# Patient Record
Sex: FEMALE | Race: WHITE | Employment: FULL TIME | URBAN - METROPOLITAN AREA
[De-identification: names, ages, dates, MRNs, and addresses within clinical notes are randomized per-mention and may not be internally consistent; named-entity substitution may affect disease eponyms.]

---

## 2017-01-31 PROCEDURE — 88305 TISSUE EXAM BY PATHOLOGIST: CPT | Performed by: OBSTETRICS & GYNECOLOGY

## 2017-03-02 ENCOUNTER — OFFICE VISIT (OUTPATIENT)
Dept: INTERNAL MEDICINE CLINIC | Facility: CLINIC | Age: 62
End: 2017-03-02

## 2017-03-02 VITALS
SYSTOLIC BLOOD PRESSURE: 130 MMHG | BODY MASS INDEX: 26.29 KG/M2 | HEIGHT: 64 IN | OXYGEN SATURATION: 98 % | WEIGHT: 154 LBS | HEART RATE: 80 BPM | DIASTOLIC BLOOD PRESSURE: 78 MMHG | TEMPERATURE: 98 F

## 2017-03-02 DIAGNOSIS — Z00.00 ANNUAL PHYSICAL EXAM: ICD-10-CM

## 2017-03-02 DIAGNOSIS — R53.83 FATIGUE, UNSPECIFIED TYPE: ICD-10-CM

## 2017-03-02 DIAGNOSIS — Z12.11 ENCOUNTER FOR SCREENING COLONOSCOPY FOR NON-HIGH-RISK PATIENT: Primary | ICD-10-CM

## 2017-03-02 DIAGNOSIS — Z82.49 FAMILY HISTORY OF HYPERTROPHIC CARDIOMYOPATHY: ICD-10-CM

## 2017-03-02 PROCEDURE — 99386 PREV VISIT NEW AGE 40-64: CPT | Performed by: INTERNAL MEDICINE

## 2017-03-02 RX ORDER — BIOTIN 1 MG
1 TABLET ORAL DAILY
COMMUNITY

## 2017-03-02 NOTE — PROGRESS NOTES
Otis Olivera is a 63year old female. Patient presents with:  Neck Pain: Patient complains of left sided neck pain. She first noticed it last Friday. She tried using a heat patch with no relief. She has tried ibuprofen with some relief.  She works at [employer] a M great aunt- dx age 52's   • Cancer Other    • leukemia[other] [OTHER] Paternal Grandmother 76   • Cancer Paternal Grandmother    • Alcohol and Other Disorders Associated Father    • liver cancer[other] [OTHER] Maternal Grandmother    • aneurysm[other check 2d echo. - CARD ECHO 2D DOPPLER (CPT=93306); Future    3. Annual physical exam  Provided referral to GI, mammogram and pap per gyne. Given orders for fasting blood work. - Comp Metabolic Panel (14) [E]; Future  - Lipid Panel [E]; Future    4.  Nec

## 2017-03-14 RX ORDER — METOCLOPRAMIDE 10 MG/1
10 TABLET ORAL ONCE
Status: CANCELLED | OUTPATIENT
Start: 2017-03-14 | End: 2017-03-14

## 2017-03-14 RX ORDER — ACETAMINOPHEN 325 MG/1
650 TABLET ORAL ONCE
Status: CANCELLED | OUTPATIENT
Start: 2017-03-14 | End: 2017-03-14

## 2017-03-14 RX ORDER — FAMOTIDINE 20 MG/1
20 TABLET ORAL ONCE
Status: CANCELLED | OUTPATIENT
Start: 2017-03-14 | End: 2017-03-14

## 2017-03-16 NOTE — PROGRESS NOTES
Pre-Operative History and Physical    Diagnosis: postmenopausal bleeding    Planned Procedure: Hysteroscopy. D&C    HPI:  Manas Thorpe is a 63year old [id number] Patient's last menstrual period was 12/10/2009.  who presents for evaluation of postmenopausal bl negative; denies shortness of breath  Gastrointestinal: negative; denies heartburn, abdominal pain, diarrhea or constiptation  Genitourinary: negative; denies dysuria, incontinence, vaginal itching or discharge.     Musculoskeletal: negative; denies back pa

## 2017-03-17 ENCOUNTER — ANESTHESIA (OUTPATIENT)
Dept: SURGERY | Facility: HOSPITAL | Age: 62
End: 2017-03-17
Payer: COMMERCIAL

## 2017-03-17 ENCOUNTER — SURGERY (OUTPATIENT)
Age: 62
End: 2017-03-17

## 2017-03-17 ENCOUNTER — ANESTHESIA EVENT (OUTPATIENT)
Dept: SURGERY | Facility: HOSPITAL | Age: 62
End: 2017-03-17
Payer: COMMERCIAL

## 2017-03-17 ENCOUNTER — HOSPITAL ENCOUNTER (OUTPATIENT)
Facility: HOSPITAL | Age: 62
Setting detail: HOSPITAL OUTPATIENT SURGERY
Discharge: HOME | End: 2017-03-17
Attending: OBSTETRICS & GYNECOLOGY | Admitting: OBSTETRICS & GYNECOLOGY
Payer: COMMERCIAL

## 2017-03-17 VITALS
SYSTOLIC BLOOD PRESSURE: 133 MMHG | RESPIRATION RATE: 14 BRPM | BODY MASS INDEX: 26.46 KG/M2 | DIASTOLIC BLOOD PRESSURE: 62 MMHG | WEIGHT: 155 LBS | HEIGHT: 64 IN | TEMPERATURE: 97 F | HEART RATE: 61 BPM | OXYGEN SATURATION: 100 %

## 2017-03-17 DIAGNOSIS — N95.0 POSTMENOPAUSAL BLEEDING: Primary | ICD-10-CM

## 2017-03-17 PROCEDURE — 88305 TISSUE EXAM BY PATHOLOGIST: CPT | Performed by: OBSTETRICS & GYNECOLOGY

## 2017-03-17 PROCEDURE — 0UDB8ZX EXTRACTION OF ENDOMETRIUM, VIA NATURAL OR ARTIFICIAL OPENING ENDOSCOPIC, DIAGNOSTIC: ICD-10-PCS | Performed by: OBSTETRICS & GYNECOLOGY

## 2017-03-17 RX ORDER — ONDANSETRON 2 MG/ML
4 INJECTION INTRAMUSCULAR; INTRAVENOUS ONCE AS NEEDED
Status: DISCONTINUED | OUTPATIENT
Start: 2017-03-17 | End: 2017-03-17

## 2017-03-17 RX ORDER — ONDANSETRON 4 MG/1
4 TABLET, FILM COATED ORAL EVERY 8 HOURS PRN
Status: DISCONTINUED | OUTPATIENT
Start: 2017-03-17 | End: 2017-03-17

## 2017-03-17 RX ORDER — HYDROMORPHONE HYDROCHLORIDE 1 MG/ML
0.2 INJECTION, SOLUTION INTRAMUSCULAR; INTRAVENOUS; SUBCUTANEOUS EVERY 5 MIN PRN
Status: DISCONTINUED | OUTPATIENT
Start: 2017-03-17 | End: 2017-03-17

## 2017-03-17 RX ORDER — ACETAMINOPHEN 325 MG/1
650 TABLET ORAL EVERY 4 HOURS PRN
Status: DISCONTINUED | OUTPATIENT
Start: 2017-03-17 | End: 2017-03-17

## 2017-03-17 RX ORDER — MIDAZOLAM HYDROCHLORIDE 1 MG/ML
INJECTION INTRAMUSCULAR; INTRAVENOUS AS NEEDED
Status: DISCONTINUED | OUTPATIENT
Start: 2017-03-17 | End: 2017-03-17 | Stop reason: SURG

## 2017-03-17 RX ORDER — ONDANSETRON 2 MG/ML
4 INJECTION INTRAMUSCULAR; INTRAVENOUS EVERY 8 HOURS PRN
Status: DISCONTINUED | OUTPATIENT
Start: 2017-03-17 | End: 2017-03-17

## 2017-03-17 RX ORDER — MORPHINE SULFATE 2 MG/ML
2 INJECTION, SOLUTION INTRAMUSCULAR; INTRAVENOUS EVERY 10 MIN PRN
Status: DISCONTINUED | OUTPATIENT
Start: 2017-03-17 | End: 2017-03-17

## 2017-03-17 RX ORDER — HYDROCODONE BITARTRATE AND ACETAMINOPHEN 5; 325 MG/1; MG/1
2 TABLET ORAL AS NEEDED
Status: DISCONTINUED | OUTPATIENT
Start: 2017-03-17 | End: 2017-03-17

## 2017-03-17 RX ORDER — HYDROMORPHONE HYDROCHLORIDE 1 MG/ML
0.4 INJECTION, SOLUTION INTRAMUSCULAR; INTRAVENOUS; SUBCUTANEOUS EVERY 5 MIN PRN
Status: DISCONTINUED | OUTPATIENT
Start: 2017-03-17 | End: 2017-03-17

## 2017-03-17 RX ORDER — MORPHINE SULFATE 10 MG/ML
6 INJECTION, SOLUTION INTRAMUSCULAR; INTRAVENOUS EVERY 10 MIN PRN
Status: DISCONTINUED | OUTPATIENT
Start: 2017-03-17 | End: 2017-03-17

## 2017-03-17 RX ORDER — HYDROCODONE BITARTRATE AND ACETAMINOPHEN 5; 325 MG/1; MG/1
1 TABLET ORAL EVERY 6 HOURS PRN
Qty: 20 TABLET | Refills: 0 | Status: SHIPPED | COMMUNITY
Start: 2017-03-17 | End: 2017-10-17

## 2017-03-17 RX ORDER — HYDROCODONE BITARTRATE AND ACETAMINOPHEN 5; 325 MG/1; MG/1
1 TABLET ORAL EVERY 4 HOURS PRN
Status: DISCONTINUED | OUTPATIENT
Start: 2017-03-17 | End: 2017-03-17

## 2017-03-17 RX ORDER — SODIUM CHLORIDE, SODIUM LACTATE, POTASSIUM CHLORIDE, CALCIUM CHLORIDE 600; 310; 30; 20 MG/100ML; MG/100ML; MG/100ML; MG/100ML
INJECTION, SOLUTION INTRAVENOUS CONTINUOUS
Status: DISCONTINUED | OUTPATIENT
Start: 2017-03-17 | End: 2017-03-17

## 2017-03-17 RX ORDER — LIDOCAINE HYDROCHLORIDE 10 MG/ML
INJECTION, SOLUTION EPIDURAL; INFILTRATION; INTRACAUDAL; PERINEURAL AS NEEDED
Status: DISCONTINUED | OUTPATIENT
Start: 2017-03-17 | End: 2017-03-17 | Stop reason: SURG

## 2017-03-17 RX ORDER — HYDROCODONE BITARTRATE AND ACETAMINOPHEN 5; 325 MG/1; MG/1
1 TABLET ORAL AS NEEDED
Status: DISCONTINUED | OUTPATIENT
Start: 2017-03-17 | End: 2017-03-17

## 2017-03-17 RX ORDER — ONDANSETRON 2 MG/ML
INJECTION INTRAMUSCULAR; INTRAVENOUS AS NEEDED
Status: DISCONTINUED | OUTPATIENT
Start: 2017-03-17 | End: 2017-03-17 | Stop reason: SURG

## 2017-03-17 RX ORDER — MORPHINE SULFATE 4 MG/ML
4 INJECTION, SOLUTION INTRAMUSCULAR; INTRAVENOUS EVERY 10 MIN PRN
Status: DISCONTINUED | OUTPATIENT
Start: 2017-03-17 | End: 2017-03-17

## 2017-03-17 RX ORDER — DEXAMETHASONE SODIUM PHOSPHATE 4 MG/ML
VIAL (ML) INJECTION AS NEEDED
Status: DISCONTINUED | OUTPATIENT
Start: 2017-03-17 | End: 2017-03-17 | Stop reason: SURG

## 2017-03-17 RX ORDER — HYDROMORPHONE HYDROCHLORIDE 1 MG/ML
0.6 INJECTION, SOLUTION INTRAMUSCULAR; INTRAVENOUS; SUBCUTANEOUS EVERY 5 MIN PRN
Status: DISCONTINUED | OUTPATIENT
Start: 2017-03-17 | End: 2017-03-17

## 2017-03-17 RX ORDER — NALOXONE HYDROCHLORIDE 0.4 MG/ML
80 INJECTION, SOLUTION INTRAMUSCULAR; INTRAVENOUS; SUBCUTANEOUS AS NEEDED
Status: DISCONTINUED | OUTPATIENT
Start: 2017-03-17 | End: 2017-03-17

## 2017-03-17 RX ORDER — HYDROCODONE BITARTRATE AND ACETAMINOPHEN 5; 325 MG/1; MG/1
2 TABLET ORAL EVERY 4 HOURS PRN
Status: DISCONTINUED | OUTPATIENT
Start: 2017-03-17 | End: 2017-03-17

## 2017-03-17 RX ADMIN — SODIUM CHLORIDE, SODIUM LACTATE, POTASSIUM CHLORIDE, CALCIUM CHLORIDE: 600; 310; 30; 20 INJECTION, SOLUTION INTRAVENOUS at 07:28:00

## 2017-03-17 RX ADMIN — MIDAZOLAM HYDROCHLORIDE 2 MG: 1 INJECTION INTRAMUSCULAR; INTRAVENOUS at 07:28:00

## 2017-03-17 RX ADMIN — LIDOCAINE HYDROCHLORIDE 30 MG: 10 INJECTION, SOLUTION EPIDURAL; INFILTRATION; INTRACAUDAL; PERINEURAL at 07:33:00

## 2017-03-17 RX ADMIN — DEXAMETHASONE SODIUM PHOSPHATE 4 MG: 4 MG/ML VIAL (ML) INJECTION at 07:33:00

## 2017-03-17 RX ADMIN — SODIUM CHLORIDE, SODIUM LACTATE, POTASSIUM CHLORIDE, CALCIUM CHLORIDE: 600; 310; 30; 20 INJECTION, SOLUTION INTRAVENOUS at 08:00:00

## 2017-03-17 RX ADMIN — ONDANSETRON 4 MG: 2 INJECTION INTRAMUSCULAR; INTRAVENOUS at 08:01:00

## 2017-03-17 NOTE — ANESTHESIA PREPROCEDURE EVALUATION
Anesthesia PreOp Note    HPI:     Sindy Livingston is a 63year old female who presents for preoperative consultation requested by: Denia Michelle MD    Date of Surgery: 3/17/2017    Procedure(s):   HYSTEROSCOPY DILATION AND CURETTAGE  Indication: post menopa Paternal Grandmother    • Alcohol and Other Disorders Associated Father    • liver cancer[other] [OTHER] Maternal Grandmother    • aneurysm[other] [OTHER] Maternal Grandmother      aneurysm of stomach   • Cancer Maternal Grandmother    • Cancer Maternal Gr Anesthesia Plan:   ASA:  2  Plan:   General  Airway:  LMA  Post-op Pain Management: IV analgesics  Informed Consent Plan and Risks Discussed With:  Patient  Discussed plan with:  CRNA      I have informed Ashley Acevedo  of the nature of the anesthetic tawanna

## 2017-03-17 NOTE — OPERATIVE REPORT
[facility]    PATIENT'S NAME: Loreta Whitehall   ATTENDING PHYSICIAN: Krystyna Davis MD   OPERATING PHYSICIAN: Krystyna Davis MD   PATIENT ACCOUNT#:   [id number]    LOCATION:  [facility] [address] [place] [address]  MEDICAL RECORD #:   [id number]       DATE OF BIRTH:

## 2017-03-17 NOTE — DISCHARGE SUMMARY
[facility]    Discharge Summary    Amanda Murphy Patient Status:  Hospital Outpatient Surgery    1/15/1955 MRN [id number]   Location [place] [address] Attending Fauzia De Jesus MD   Hosp Day # 0 PCP DO Antonio Gonzalez · Do not sign legal documents within 24 hours of your procedure   · If you had a nerve block for your surgery, take extra care not to put any pressure on your arm or hand for 24 hours    It is normal:  · For you to have a sore throat if you had a breathi answer the questions and return in the provided self-addressed stamped envelope. The questionnaire should take no longer than a few minutes to complete and your answers are private. Your health and feedback are important to us!     If you receive a NSQI

## 2017-03-17 NOTE — H&P
Pre-Operative History and Physical    Diagnosis: postmenopausal bleeding    Planned Procedure: Hysteroscopy. D&C    HPI:  Manas Thorpe is a 63year old [id number] Patient's last menstrual period was 12/10/2009.  who presents for evaluation of postmenopausal bl congestion or sinus pain  Cardiovascular: negative; denies chest pain or palpitations  Respiratory: negative; denies shortness of breath  Gastrointestinal: negative; denies heartburn, abdominal pain, diarrhea or constiptation  Genitourinary: negative; philipp

## 2017-03-17 NOTE — BRIEF OP NOTE
[facility] [address]  Brief Op Note     Mitch Mooney Location: OR   [facility] [id number] MRN [id number]   Admission Date 3/17/2017 Operation Date 3/17/2017   Attending Physician Ramon Garvey MD Operating Physician Ed Dawn MD       Pre-Oper

## 2017-03-21 NOTE — PROGRESS NOTES
Quick Note:    [phone] (home)   Telephone Information:  Mobile [phone]  [facility]    ______

## 2017-10-17 ENCOUNTER — OFFICE VISIT (OUTPATIENT)
Dept: INTERNAL MEDICINE CLINIC | Facility: CLINIC | Age: 62
End: 2017-10-17

## 2017-10-17 VITALS
BODY MASS INDEX: 27.38 KG/M2 | HEART RATE: 80 BPM | DIASTOLIC BLOOD PRESSURE: 76 MMHG | WEIGHT: 160.38 LBS | SYSTOLIC BLOOD PRESSURE: 136 MMHG | TEMPERATURE: 98 F | OXYGEN SATURATION: 98 % | HEIGHT: 64 IN

## 2017-10-17 DIAGNOSIS — N30.01 ACUTE CYSTITIS WITH HEMATURIA: Primary | ICD-10-CM

## 2017-10-17 PROCEDURE — 99212 OFFICE O/P EST SF 10 MIN: CPT | Performed by: INTERNAL MEDICINE

## 2017-10-17 PROCEDURE — 99213 OFFICE O/P EST LOW 20 MIN: CPT | Performed by: INTERNAL MEDICINE

## 2017-10-17 RX ORDER — MELATONIN
1000 DAILY
COMMUNITY

## 2017-10-17 RX ORDER — NITROFURANTOIN 25; 75 MG/1; MG/1
100 CAPSULE ORAL 2 TIMES DAILY
Qty: 14 CAPSULE | Refills: 0 | Status: SHIPPED | OUTPATIENT
Start: 2017-10-17 | End: 2017-12-06 | Stop reason: ALTCHOICE

## 2017-10-17 NOTE — PROGRESS NOTES
Otis Olivera is a 63year old female. Patient presents with:  Urinary: Patient noticed blood in her urine a couple weeks ago. It seemed to subside. The last couple of days she has noticied the blood again. Denies burning.  Feels a \"pinch\" after she urinate 12/10/2009   SpO2 98%   BMI 27.53 kg/m²   GENERAL: well developed, well nourished, in no apparent distress  BACK: no CVA tenderness  CARDIO: RRR, normal S1S2, without murmur or gallop  GI: soft, NT, ND, NABS, no HSM      ASSESSMENT AND PLAN:     1.  Acute c

## 2017-10-31 ENCOUNTER — TELEPHONE (OUTPATIENT)
Dept: INTERNAL MEDICINE CLINIC | Facility: CLINIC | Age: 62
End: 2017-10-31

## 2017-10-31 DIAGNOSIS — R31.9 HEMATURIA, UNSPECIFIED TYPE: Primary | ICD-10-CM

## 2017-10-31 NOTE — TELEPHONE ENCOUNTER
Pt was seen on 10/17 for UTI, symptoms cleared but last night the symptom returned she has blood in urine and a little blod clot. It is the same as before. Please call pt to discuss [phone].    Tasked to nursing high

## 2017-10-31 NOTE — TELEPHONE ENCOUNTER
To Dr. Leslie Jeong - see below - sx did clear up, last abx dose was last Tuesday AM - last evening noticed hematuria, clear red now is dark red. Pt will increase fluids today. Vaginal spotting, D&C (3/17) was negative - seeing OB in December.

## 2017-10-31 NOTE — TELEPHONE ENCOUNTER
To nursing,  I note pt called with sx of blood in urine-similar symptoms she had when she saw Dr. Elba Bean 2 weeks ago. tell patient on 10/17/17 urine culture was negative for urinary tract infection.  Therefore it does not appear her blood in urine is r

## 2017-10-31 NOTE — TELEPHONE ENCOUNTER
To Dr. Wills Cons - your message relayed to pt. understanding verbalized.  Pt unable to come today/tomorrow (taking mother to  Today, works tomorrow) but will call Thursday AM to make appt with Dr. Terence Rosales for \"same day/sick\" appt since that is all Dr. Baldemar Barber

## 2017-11-02 ENCOUNTER — APPOINTMENT (OUTPATIENT)
Dept: LAB | Age: 62
End: 2017-11-02
Attending: INTERNAL MEDICINE
Payer: COMMERCIAL

## 2017-11-02 DIAGNOSIS — R31.9 HEMATURIA, UNSPECIFIED TYPE: ICD-10-CM

## 2017-11-02 PROCEDURE — 81001 URINALYSIS AUTO W/SCOPE: CPT

## 2017-11-02 PROCEDURE — 81015 MICROSCOPIC EXAM OF URINE: CPT

## 2017-11-02 PROCEDURE — 87086 URINE CULTURE/COLONY COUNT: CPT

## 2017-11-02 NOTE — TELEPHONE ENCOUNTER
To Dr. Harriet Sánchez - see below - Pt stopped bleeding as of yesterday evening - not bleeding at all - random onset. Pt has appt at [facility] today with you - does she need to be seen?   See specialist?

## 2017-11-02 NOTE — TELEPHONE ENCOUNTER
Per Dr. Kylie Burton, U/A, urine culture and urine microscopy ordered. Pt informed to keep hydrated and drop off urine sample at lab. Pt is to f/u with Dr. Xander Maloney next week. Pt informed and verbalized understanding.

## 2017-11-05 ENCOUNTER — TELEPHONE (OUTPATIENT)
Dept: INTERNAL MEDICINE CLINIC | Facility: CLINIC | Age: 62
End: 2017-11-05

## 2017-11-05 NOTE — TELEPHONE ENCOUNTER
Pt had repeat UA on 11/2 -- large blood on dipstick but only 2 RBC's on the micro. Given her reports of gross hematuria at home, I recommend that she see urology Dr. oYvany Falcon.   Also I see that pt has an appt with me on 11/7/17 -- not sure why; she

## 2017-11-06 NOTE — TELEPHONE ENCOUNTER
Patient called back - relayed DR. VENTURA message and gave number for DR. Wanetta Dubin [phone] . Patient verbalized understanding and cancelled milo for tomorrow 11/7 with DR. Diego Sharp

## 2017-12-09 ENCOUNTER — HOSPITAL ENCOUNTER (OUTPATIENT)
Dept: CT IMAGING | Facility: HOSPITAL | Age: 62
Discharge: HOME | End: 2017-12-09
Attending: UROLOGY
Payer: COMMERCIAL

## 2017-12-09 DIAGNOSIS — K31.0 ACUTE DILATATION OF STOMACH: ICD-10-CM

## 2017-12-09 PROCEDURE — 74178 CT ABD&PLV WO CNTR FLWD CNTR: CPT | Performed by: UROLOGY

## 2017-12-13 ENCOUNTER — LAB ENCOUNTER (OUTPATIENT)
Dept: LAB | Facility: HOSPITAL | Age: 62
End: 2017-12-13
Attending: UROLOGY
Payer: COMMERCIAL

## 2017-12-13 ENCOUNTER — APPOINTMENT (OUTPATIENT)
Dept: LAB | Facility: HOSPITAL | Age: 62
End: 2017-12-13
Attending: UROLOGY
Payer: COMMERCIAL

## 2017-12-13 DIAGNOSIS — D49.4 BLADDER NEOPLASM: Primary | ICD-10-CM

## 2017-12-13 DIAGNOSIS — D49.4 BLADDER NEOPLASM: ICD-10-CM

## 2017-12-13 PROCEDURE — 93005 ELECTROCARDIOGRAM TRACING: CPT

## 2017-12-13 PROCEDURE — 36415 COLL VENOUS BLD VENIPUNCTURE: CPT

## 2017-12-13 PROCEDURE — 80048 BASIC METABOLIC PNL TOTAL CA: CPT

## 2017-12-13 PROCEDURE — 93010 ELECTROCARDIOGRAM REPORT: CPT | Performed by: UROLOGY

## 2017-12-13 PROCEDURE — 85025 COMPLETE CBC W/AUTO DIFF WBC: CPT

## 2017-12-13 PROCEDURE — 85610 PROTHROMBIN TIME: CPT

## 2017-12-18 ENCOUNTER — SURGERY (OUTPATIENT)
Age: 62
End: 2017-12-18

## 2017-12-18 ENCOUNTER — ANESTHESIA (OUTPATIENT)
Dept: SURGERY | Facility: HOSPITAL | Age: 62
End: 2017-12-18
Payer: COMMERCIAL

## 2017-12-18 ENCOUNTER — ANESTHESIA EVENT (OUTPATIENT)
Dept: SURGERY | Facility: HOSPITAL | Age: 62
End: 2017-12-18
Payer: COMMERCIAL

## 2017-12-18 ENCOUNTER — HOSPITAL ENCOUNTER (OUTPATIENT)
Facility: HOSPITAL | Age: 62
Setting detail: HOSPITAL OUTPATIENT SURGERY
Discharge: HOME | End: 2017-12-18
Attending: UROLOGY | Admitting: UROLOGY
Payer: COMMERCIAL

## 2017-12-18 VITALS
DIASTOLIC BLOOD PRESSURE: 76 MMHG | HEIGHT: 64 IN | OXYGEN SATURATION: 98 % | HEART RATE: 63 BPM | WEIGHT: 156 LBS | BODY MASS INDEX: 26.63 KG/M2 | SYSTOLIC BLOOD PRESSURE: 146 MMHG | RESPIRATION RATE: 14 BRPM | TEMPERATURE: 97 F

## 2017-12-18 PROCEDURE — 88307 TISSUE EXAM BY PATHOLOGIST: CPT | Performed by: UROLOGY

## 2017-12-18 PROCEDURE — 3E0K805 INTRODUCTION OF OTHER ANTINEOPLASTIC INTO GENITOURINARY TRACT, VIA NATURAL OR ARTIFICIAL OPENING ENDOSCOPIC: ICD-10-PCS | Performed by: UROLOGY

## 2017-12-18 PROCEDURE — 0TBB8ZX EXCISION OF BLADDER, VIA NATURAL OR ARTIFICIAL OPENING ENDOSCOPIC, DIAGNOSTIC: ICD-10-PCS | Performed by: UROLOGY

## 2017-12-18 RX ORDER — HYDROMORPHONE HYDROCHLORIDE 1 MG/ML
0.4 INJECTION, SOLUTION INTRAMUSCULAR; INTRAVENOUS; SUBCUTANEOUS EVERY 5 MIN PRN
Status: DISCONTINUED | OUTPATIENT
Start: 2017-12-18 | End: 2017-12-18

## 2017-12-18 RX ORDER — CLINDAMYCIN PHOSPHATE 150 MG/ML
INJECTION, SOLUTION INTRAVENOUS AS NEEDED
Status: DISCONTINUED | OUTPATIENT
Start: 2017-12-18 | End: 2017-12-18 | Stop reason: SURG

## 2017-12-18 RX ORDER — SODIUM CHLORIDE, SODIUM LACTATE, POTASSIUM CHLORIDE, CALCIUM CHLORIDE 600; 310; 30; 20 MG/100ML; MG/100ML; MG/100ML; MG/100ML
INJECTION, SOLUTION INTRAVENOUS CONTINUOUS
Status: DISCONTINUED | OUTPATIENT
Start: 2017-12-18 | End: 2017-12-18

## 2017-12-18 RX ORDER — MORPHINE SULFATE 4 MG/ML
4 INJECTION, SOLUTION INTRAMUSCULAR; INTRAVENOUS EVERY 10 MIN PRN
Status: DISCONTINUED | OUTPATIENT
Start: 2017-12-18 | End: 2017-12-18

## 2017-12-18 RX ORDER — HYDROCODONE BITARTRATE AND ACETAMINOPHEN 5; 325 MG/1; MG/1
1 TABLET ORAL AS NEEDED
Status: DISCONTINUED | OUTPATIENT
Start: 2017-12-18 | End: 2017-12-18

## 2017-12-18 RX ORDER — ONDANSETRON 2 MG/ML
INJECTION INTRAMUSCULAR; INTRAVENOUS AS NEEDED
Status: DISCONTINUED | OUTPATIENT
Start: 2017-12-18 | End: 2017-12-18 | Stop reason: SURG

## 2017-12-18 RX ORDER — HYDROCODONE BITARTRATE AND ACETAMINOPHEN 5; 325 MG/1; MG/1
2 TABLET ORAL AS NEEDED
Status: DISCONTINUED | OUTPATIENT
Start: 2017-12-18 | End: 2017-12-18

## 2017-12-18 RX ORDER — MORPHINE SULFATE 2 MG/ML
2 INJECTION, SOLUTION INTRAMUSCULAR; INTRAVENOUS EVERY 10 MIN PRN
Status: DISCONTINUED | OUTPATIENT
Start: 2017-12-18 | End: 2017-12-18

## 2017-12-18 RX ORDER — HYDROMORPHONE HYDROCHLORIDE 1 MG/ML
0.2 INJECTION, SOLUTION INTRAMUSCULAR; INTRAVENOUS; SUBCUTANEOUS EVERY 5 MIN PRN
Status: DISCONTINUED | OUTPATIENT
Start: 2017-12-18 | End: 2017-12-18

## 2017-12-18 RX ORDER — NALOXONE HYDROCHLORIDE 0.4 MG/ML
80 INJECTION, SOLUTION INTRAMUSCULAR; INTRAVENOUS; SUBCUTANEOUS AS NEEDED
Status: DISCONTINUED | OUTPATIENT
Start: 2017-12-18 | End: 2017-12-18

## 2017-12-18 RX ORDER — MORPHINE SULFATE 10 MG/ML
6 INJECTION, SOLUTION INTRAMUSCULAR; INTRAVENOUS EVERY 10 MIN PRN
Status: DISCONTINUED | OUTPATIENT
Start: 2017-12-18 | End: 2017-12-18

## 2017-12-18 RX ORDER — HYDROMORPHONE HYDROCHLORIDE 1 MG/ML
0.6 INJECTION, SOLUTION INTRAMUSCULAR; INTRAVENOUS; SUBCUTANEOUS EVERY 5 MIN PRN
Status: DISCONTINUED | OUTPATIENT
Start: 2017-12-18 | End: 2017-12-18

## 2017-12-18 RX ORDER — CLINDAMYCIN PHOSPHATE 900 MG/50ML
900 INJECTION INTRAVENOUS ONCE
Status: DISCONTINUED | OUTPATIENT
Start: 2017-12-18 | End: 2017-12-18 | Stop reason: HOSPADM

## 2017-12-18 RX ORDER — FAMOTIDINE 20 MG/1
20 TABLET ORAL ONCE
Status: DISCONTINUED | OUTPATIENT
Start: 2017-12-18 | End: 2017-12-18 | Stop reason: HOSPADM

## 2017-12-18 RX ORDER — LIDOCAINE HYDROCHLORIDE 10 MG/ML
INJECTION, SOLUTION EPIDURAL; INFILTRATION; INTRACAUDAL; PERINEURAL AS NEEDED
Status: DISCONTINUED | OUTPATIENT
Start: 2017-12-18 | End: 2017-12-18 | Stop reason: SURG

## 2017-12-18 RX ORDER — PHENAZOPYRIDINE HYDROCHLORIDE 200 MG/1
200 TABLET, FILM COATED ORAL ONCE
Status: DISCONTINUED | OUTPATIENT
Start: 2017-12-18 | End: 2017-12-18

## 2017-12-18 RX ORDER — ACETAMINOPHEN 500 MG
1000 TABLET ORAL ONCE
Status: COMPLETED | OUTPATIENT
Start: 2017-12-18 | End: 2017-12-18

## 2017-12-18 RX ORDER — HALOPERIDOL 5 MG/ML
0.25 INJECTION INTRAMUSCULAR ONCE AS NEEDED
Status: DISCONTINUED | OUTPATIENT
Start: 2017-12-18 | End: 2017-12-18

## 2017-12-18 RX ORDER — METOCLOPRAMIDE 10 MG/1
10 TABLET ORAL ONCE
Status: DISCONTINUED | OUTPATIENT
Start: 2017-12-18 | End: 2017-12-18 | Stop reason: HOSPADM

## 2017-12-18 RX ORDER — ONDANSETRON 2 MG/ML
4 INJECTION INTRAMUSCULAR; INTRAVENOUS ONCE AS NEEDED
Status: DISCONTINUED | OUTPATIENT
Start: 2017-12-18 | End: 2017-12-18

## 2017-12-18 RX ORDER — PHENAZOPYRIDINE HYDROCHLORIDE 200 MG/1
200 TABLET, FILM COATED ORAL 3 TIMES DAILY PRN
Qty: 12 TABLET | Refills: 0 | Status: SHIPPED | OUTPATIENT
Start: 2017-12-18 | End: 2018-01-19

## 2017-12-18 RX ORDER — DEXAMETHASONE SODIUM PHOSPHATE 4 MG/ML
VIAL (ML) INJECTION AS NEEDED
Status: DISCONTINUED | OUTPATIENT
Start: 2017-12-18 | End: 2017-12-18 | Stop reason: SURG

## 2017-12-18 RX ADMIN — ONDANSETRON 4 MG: 2 INJECTION INTRAMUSCULAR; INTRAVENOUS at 11:55:00

## 2017-12-18 RX ADMIN — LIDOCAINE HYDROCHLORIDE 50 MG: 10 INJECTION, SOLUTION EPIDURAL; INFILTRATION; INTRACAUDAL; PERINEURAL at 11:31:00

## 2017-12-18 RX ADMIN — SODIUM CHLORIDE, SODIUM LACTATE, POTASSIUM CHLORIDE, CALCIUM CHLORIDE: 600; 310; 30; 20 INJECTION, SOLUTION INTRAVENOUS at 11:31:00

## 2017-12-18 RX ADMIN — DEXAMETHASONE SODIUM PHOSPHATE 4 MG: 4 MG/ML VIAL (ML) INJECTION at 11:36:00

## 2017-12-18 RX ADMIN — CLINDAMYCIN PHOSPHATE 900 MG: 150 INJECTION, SOLUTION INTRAVENOUS at 11:40:00

## 2017-12-18 NOTE — BRIEF OP NOTE
BRIEF PROCEDURE NOTE    PATIENT: Bella Camacho; PEQ:[id number]; : 1/15/1955  DATE OF SURGERY: 2017    PREOP DIAGNOSIS:  bladder tumor  POSTOP DIAGNOSIS:  same  PROCEDURE:  cystoscopy with transurethral resection of bladder tumor, including unroofi

## 2017-12-18 NOTE — INTERVAL H&P NOTE
Pre-op Diagnosis: bladder tumor    The above referenced H&P was reviewed by Aurora Hay MD on 12/18/2017, the patient was examined and no significant changes have occurred in the patient's condition since the H&P was performed.   I discussed with the p

## 2017-12-18 NOTE — ANESTHESIA POSTPROCEDURE EVALUATION
Patient: Manas Thorpe    Procedure Summary     Date:  12/18/17 Room / Location:  [address] [place] [address] MAIN OR 14 / [address] [place] [address] MAIN OR    Anesthesia Start:  1128 Anesthesia Stop:      Procedure:  CYSTOSCOPY TRANSURETHRAL RESECTION BLADDER TUMOR (Bilateral ) Diagnosis:  (bladder tu

## 2017-12-18 NOTE — OPERATIVE REPORT
[facility]    PATIENT'S NAME: Winifred Elena   ATTENDING PHYSICIAN: Burak Guerin MD   OPERATING PHYSICIAN: Burak Guerin MD   PATIENT ACCOUNT#:   [de-identified]    LOCATION:  [place] [address]  MEDICAL RECORD #:   [id number]       DATE OF BI then, using the bipolar resectoscope, took down the tumor. In the course of this, I felt that the ureteral orifice was somewhat compromised thus I did a very vigorous unroofing of the ureteral orifice as well. All tumor chips were sent off for analysis.

## 2017-12-18 NOTE — ANESTHESIA PREPROCEDURE EVALUATION
Anesthesia PreOp Note    HPI:     Altagracia Sy is a 63year old female who presents for preoperative consultation requested by:  Ramírez Puckett MD    Date of Surgery: 12/18/2017    Procedure(s):  CYSTOSCOPY TRANSURETHRAL RESECTION BLADDER TUMOR  Indicati and Other Disorders Associated Father    • Cancer Maternal Grandmother    • liver cancer [OTHER] Maternal Grandmother    • aneurysm [OTHER] Maternal Grandmother      aneurysm of stomach   • Pacemaker Mother      hypertrophic cardiomyopathy   • Hypertension Hx and Physical Exam     Patient summary reviewed and Nursing notes reviewed    Airway   Mallampati: I  TM distance: >3 FB  Neck ROM: full  Dental - normal exam     Pulmonary - negative ROS and normal exam   Cardiovascular - negative ROS and normal exam  E

## 2017-12-19 ENCOUNTER — HOSPITAL ENCOUNTER (EMERGENCY)
Facility: HOSPITAL | Age: 62
Discharge: HOME | End: 2017-12-19
Attending: EMERGENCY MEDICINE
Payer: COMMERCIAL

## 2017-12-19 VITALS
BODY MASS INDEX: 26.63 KG/M2 | RESPIRATION RATE: 18 BRPM | HEART RATE: 76 BPM | DIASTOLIC BLOOD PRESSURE: 75 MMHG | SYSTOLIC BLOOD PRESSURE: 142 MMHG | WEIGHT: 156 LBS | HEIGHT: 64 IN | OXYGEN SATURATION: 100 % | TEMPERATURE: 98 F

## 2017-12-19 DIAGNOSIS — R11.2 NAUSEA AND VOMITING IN ADULT: Primary | ICD-10-CM

## 2017-12-19 PROCEDURE — 99283 EMERGENCY DEPT VISIT LOW MDM: CPT

## 2017-12-19 RX ORDER — METOCLOPRAMIDE 10 MG/1
10 TABLET ORAL ONCE
Status: COMPLETED | OUTPATIENT
Start: 2017-12-19 | End: 2017-12-19

## 2017-12-19 RX ORDER — ONDANSETRON 8 MG/1
8 TABLET, ORALLY DISINTEGRATING ORAL ONCE
Status: COMPLETED | OUTPATIENT
Start: 2017-12-19 | End: 2017-12-19

## 2017-12-19 RX ORDER — ONDANSETRON 4 MG/1
4 TABLET, ORALLY DISINTEGRATING ORAL EVERY 4 HOURS PRN
Qty: 15 TABLET | Refills: 0 | Status: SHIPPED | OUTPATIENT
Start: 2017-12-19 | End: 2018-01-19

## 2017-12-19 RX ORDER — METOCLOPRAMIDE 10 MG/1
10 TABLET ORAL 3 TIMES DAILY PRN
Qty: 20 TABLET | Refills: 0 | Status: SHIPPED | OUTPATIENT
Start: 2017-12-19 | End: 2018-01-18

## 2017-12-19 RX ORDER — ONDANSETRON 4 MG/1
4 TABLET, ORALLY DISINTEGRATING ORAL ONCE
Status: COMPLETED | OUTPATIENT
Start: 2017-12-19 | End: 2017-12-19

## 2017-12-19 RX ORDER — METOCLOPRAMIDE 10 MG/1
10 TABLET ORAL
Status: DISCONTINUED | OUTPATIENT
Start: 2017-12-19 | End: 2017-12-19

## 2017-12-19 NOTE — ED NOTES
Pt states had surgery to remove a bladder tumor today at 11:30am, received a Chemo pill (unsure which one) as a preventative. Now has been having n/v. Unable to to even take anything. Denies fever.

## 2017-12-19 NOTE — ED PROVIDER NOTES
Patient Seen in: [facility] AND [facility] Emergency Department    History   Patient presents with:  Postop/Procedure Problem      HPI    The patient presents complaining of nausea and vomiting that started several hours ago after having a bladder tumor removed tobacco: Never Used                        Alcohol use: Yes                Comment: Socially very rare    Drug use:  No              Sexual activity: Not Currently        Other Topics            Concern  Exercise                Yes        ROS  Pertinent Pos °C)   TempSrc:   Oral   SpO2: 99% 100% 100%   Weight: 70.8 kg     Height: 162.6 cm (5' 4\")       *I personally reviewed and interpreted all ED vitals.     Pulse Ox: 100%, Room air, Normal     Monitor Interpretation:   normal sinus rhythm    Differential Tez Lan R-0

## 2017-12-27 ENCOUNTER — TELEPHONE (OUTPATIENT)
Dept: INTERNAL MEDICINE CLINIC | Facility: CLINIC | Age: 62
End: 2017-12-27

## 2018-01-17 ENCOUNTER — LAB ENCOUNTER (OUTPATIENT)
Dept: LAB | Facility: HOSPITAL | Age: 63
End: 2018-01-17
Attending: UROLOGY
Payer: COMMERCIAL

## 2018-01-17 DIAGNOSIS — C67.9 BLADDER CANCER (HCC): Primary | ICD-10-CM

## 2018-01-17 PROCEDURE — 87086 URINE CULTURE/COLONY COUNT: CPT

## 2018-01-18 NOTE — TELEPHONE ENCOUNTER
Pt is returning Dr Yao Carroll call, please call [phone] pt is on break until 3:30.    Tasked to Dr Casilda Koyanagi

## 2018-01-19 RX ORDER — METOCLOPRAMIDE 10 MG/1
10 TABLET ORAL ONCE
Status: CANCELLED | OUTPATIENT
Start: 2018-01-19 | End: 2018-01-19

## 2018-01-19 RX ORDER — FAMOTIDINE 20 MG/1
20 TABLET ORAL ONCE
Status: CANCELLED | OUTPATIENT
Start: 2018-01-19 | End: 2018-01-19

## 2018-01-19 NOTE — TELEPHONE ENCOUNTER
Spoke with patient; she has spoken with her family about it. All 3 daughters are aware of dx. She is doing ok.

## 2018-01-22 ENCOUNTER — HOSPITAL ENCOUNTER (OUTPATIENT)
Facility: HOSPITAL | Age: 63
Discharge: HOME | End: 2018-01-24
Attending: UROLOGY | Admitting: UROLOGY
Payer: COMMERCIAL

## 2018-01-22 ENCOUNTER — ANESTHESIA (OUTPATIENT)
Dept: SURGERY | Facility: HOSPITAL | Age: 63
End: 2018-01-22
Payer: COMMERCIAL

## 2018-01-22 ENCOUNTER — ANESTHESIA EVENT (OUTPATIENT)
Dept: SURGERY | Facility: HOSPITAL | Age: 63
End: 2018-01-22
Payer: COMMERCIAL

## 2018-01-22 ENCOUNTER — SURGERY (OUTPATIENT)
Age: 63
End: 2018-01-22

## 2018-01-22 ENCOUNTER — APPOINTMENT (OUTPATIENT)
Dept: ULTRASOUND IMAGING | Facility: HOSPITAL | Age: 63
End: 2018-01-22
Attending: UROLOGY
Payer: COMMERCIAL

## 2018-01-22 PROCEDURE — 0TBB8ZX EXCISION OF BLADDER, VIA NATURAL OR ARTIFICIAL OPENING ENDOSCOPIC, DIAGNOSTIC: ICD-10-PCS | Performed by: UROLOGY

## 2018-01-22 PROCEDURE — 3E0K705 INTRODUCTION OF OTHER ANTINEOPLASTIC INTO GENITOURINARY TRACT, VIA NATURAL OR ARTIFICIAL OPENING: ICD-10-PCS | Performed by: UROLOGY

## 2018-01-22 PROCEDURE — 76770 US EXAM ABDO BACK WALL COMP: CPT | Performed by: UROLOGY

## 2018-01-22 PROCEDURE — BT1FZZZ FLUOROSCOPY OF LEFT KIDNEY, URETER AND BLADDER: ICD-10-PCS | Performed by: UROLOGY

## 2018-01-22 RX ORDER — ENOXAPARIN SODIUM 100 MG/ML
40 INJECTION SUBCUTANEOUS EVERY 24 HOURS
Status: DISCONTINUED | OUTPATIENT
Start: 2018-01-22 | End: 2018-01-24

## 2018-01-22 RX ORDER — MORPHINE SULFATE 2 MG/ML
2 INJECTION, SOLUTION INTRAMUSCULAR; INTRAVENOUS EVERY 10 MIN PRN
Status: DISCONTINUED | OUTPATIENT
Start: 2018-01-22 | End: 2018-01-22 | Stop reason: HOSPADM

## 2018-01-22 RX ORDER — LEVOFLOXACIN 5 MG/ML
500 INJECTION, SOLUTION INTRAVENOUS
Status: COMPLETED | OUTPATIENT
Start: 2018-01-22 | End: 2018-01-22

## 2018-01-22 RX ORDER — MORPHINE SULFATE 2 MG/ML
1 INJECTION, SOLUTION INTRAMUSCULAR; INTRAVENOUS EVERY 2 HOUR PRN
Status: DISCONTINUED | OUTPATIENT
Start: 2018-01-22 | End: 2018-01-24

## 2018-01-22 RX ORDER — DIPHENHYDRAMINE HYDROCHLORIDE 50 MG/ML
12.5 INJECTION INTRAMUSCULAR; INTRAVENOUS NIGHTLY PRN
Status: DISCONTINUED | OUTPATIENT
Start: 2018-01-22 | End: 2018-01-24

## 2018-01-22 RX ORDER — LIDOCAINE HYDROCHLORIDE 10 MG/ML
INJECTION, SOLUTION EPIDURAL; INFILTRATION; INTRACAUDAL; PERINEURAL AS NEEDED
Status: DISCONTINUED | OUTPATIENT
Start: 2018-01-22 | End: 2018-01-22 | Stop reason: SURG

## 2018-01-22 RX ORDER — DEXAMETHASONE SODIUM PHOSPHATE 4 MG/ML
VIAL (ML) INJECTION AS NEEDED
Status: DISCONTINUED | OUTPATIENT
Start: 2018-01-22 | End: 2018-01-22 | Stop reason: SURG

## 2018-01-22 RX ORDER — DIPHENHYDRAMINE HCL 25 MG
25 CAPSULE ORAL NIGHTLY PRN
Status: DISCONTINUED | OUTPATIENT
Start: 2018-01-22 | End: 2018-01-24

## 2018-01-22 RX ORDER — ACETAMINOPHEN 325 MG/1
650 TABLET ORAL EVERY 4 HOURS PRN
Status: DISCONTINUED | OUTPATIENT
Start: 2018-01-22 | End: 2018-01-24

## 2018-01-22 RX ORDER — NALOXONE HYDROCHLORIDE 0.4 MG/ML
80 INJECTION, SOLUTION INTRAMUSCULAR; INTRAVENOUS; SUBCUTANEOUS AS NEEDED
Status: DISCONTINUED | OUTPATIENT
Start: 2018-01-22 | End: 2018-01-22 | Stop reason: HOSPADM

## 2018-01-22 RX ORDER — MORPHINE SULFATE 4 MG/ML
4 INJECTION, SOLUTION INTRAMUSCULAR; INTRAVENOUS EVERY 2 HOUR PRN
Status: DISCONTINUED | OUTPATIENT
Start: 2018-01-22 | End: 2018-01-24

## 2018-01-22 RX ORDER — DOCUSATE SODIUM 100 MG/1
100 CAPSULE, LIQUID FILLED ORAL 2 TIMES DAILY
Status: DISCONTINUED | OUTPATIENT
Start: 2018-01-22 | End: 2018-01-24

## 2018-01-22 RX ORDER — HYDROMORPHONE HYDROCHLORIDE 1 MG/ML
0.2 INJECTION, SOLUTION INTRAMUSCULAR; INTRAVENOUS; SUBCUTANEOUS EVERY 5 MIN PRN
Status: DISCONTINUED | OUTPATIENT
Start: 2018-01-22 | End: 2018-01-22 | Stop reason: HOSPADM

## 2018-01-22 RX ORDER — HYDROMORPHONE HYDROCHLORIDE 1 MG/ML
0.6 INJECTION, SOLUTION INTRAMUSCULAR; INTRAVENOUS; SUBCUTANEOUS EVERY 5 MIN PRN
Status: DISCONTINUED | OUTPATIENT
Start: 2018-01-22 | End: 2018-01-22 | Stop reason: HOSPADM

## 2018-01-22 RX ORDER — PHENAZOPYRIDINE HYDROCHLORIDE 100 MG/1
200 TABLET, FILM COATED ORAL 3 TIMES DAILY PRN
Status: DISCONTINUED | OUTPATIENT
Start: 2018-01-22 | End: 2018-01-24

## 2018-01-22 RX ORDER — ONDANSETRON 2 MG/ML
INJECTION INTRAMUSCULAR; INTRAVENOUS AS NEEDED
Status: DISCONTINUED | OUTPATIENT
Start: 2018-01-22 | End: 2018-01-22 | Stop reason: SURG

## 2018-01-22 RX ORDER — HYDROCODONE BITARTRATE AND ACETAMINOPHEN 5; 325 MG/1; MG/1
2 TABLET ORAL EVERY 4 HOURS PRN
Status: DISCONTINUED | OUTPATIENT
Start: 2018-01-22 | End: 2018-01-24

## 2018-01-22 RX ORDER — ACETAMINOPHEN 500 MG
1000 TABLET ORAL ONCE
Status: COMPLETED | OUTPATIENT
Start: 2018-01-22 | End: 2018-01-22

## 2018-01-22 RX ORDER — MIDAZOLAM HYDROCHLORIDE 1 MG/ML
INJECTION INTRAMUSCULAR; INTRAVENOUS AS NEEDED
Status: DISCONTINUED | OUTPATIENT
Start: 2018-01-22 | End: 2018-01-22 | Stop reason: SURG

## 2018-01-22 RX ORDER — HYDROMORPHONE HYDROCHLORIDE 1 MG/ML
0.4 INJECTION, SOLUTION INTRAMUSCULAR; INTRAVENOUS; SUBCUTANEOUS EVERY 5 MIN PRN
Status: DISCONTINUED | OUTPATIENT
Start: 2018-01-22 | End: 2018-01-22 | Stop reason: HOSPADM

## 2018-01-22 RX ORDER — ONDANSETRON 4 MG/1
4 TABLET, ORALLY DISINTEGRATING ORAL EVERY 6 HOURS PRN
Status: DISCONTINUED | OUTPATIENT
Start: 2018-01-22 | End: 2018-01-24

## 2018-01-22 RX ORDER — MORPHINE SULFATE 4 MG/ML
4 INJECTION, SOLUTION INTRAMUSCULAR; INTRAVENOUS EVERY 10 MIN PRN
Status: DISCONTINUED | OUTPATIENT
Start: 2018-01-22 | End: 2018-01-22 | Stop reason: HOSPADM

## 2018-01-22 RX ORDER — HYDROCODONE BITARTRATE AND ACETAMINOPHEN 5; 325 MG/1; MG/1
1 TABLET ORAL AS NEEDED
Status: DISCONTINUED | OUTPATIENT
Start: 2018-01-22 | End: 2018-01-22 | Stop reason: HOSPADM

## 2018-01-22 RX ORDER — HALOPERIDOL 5 MG/ML
0.25 INJECTION INTRAMUSCULAR ONCE AS NEEDED
Status: DISCONTINUED | OUTPATIENT
Start: 2018-01-22 | End: 2018-01-22 | Stop reason: HOSPADM

## 2018-01-22 RX ORDER — MORPHINE SULFATE 2 MG/ML
2 INJECTION, SOLUTION INTRAMUSCULAR; INTRAVENOUS EVERY 2 HOUR PRN
Status: DISCONTINUED | OUTPATIENT
Start: 2018-01-22 | End: 2018-01-24

## 2018-01-22 RX ORDER — SODIUM CHLORIDE, SODIUM LACTATE, POTASSIUM CHLORIDE, CALCIUM CHLORIDE 600; 310; 30; 20 MG/100ML; MG/100ML; MG/100ML; MG/100ML
INJECTION, SOLUTION INTRAVENOUS CONTINUOUS
Status: DISCONTINUED | OUTPATIENT
Start: 2018-01-22 | End: 2018-01-23

## 2018-01-22 RX ORDER — ONDANSETRON 2 MG/ML
4 INJECTION INTRAMUSCULAR; INTRAVENOUS EVERY 6 HOURS PRN
Status: DISCONTINUED | OUTPATIENT
Start: 2018-01-22 | End: 2018-01-24

## 2018-01-22 RX ORDER — SODIUM CHLORIDE 9 MG/ML
INJECTION, SOLUTION INTRAVENOUS CONTINUOUS PRN
Status: DISCONTINUED | OUTPATIENT
Start: 2018-01-22 | End: 2018-01-22 | Stop reason: SURG

## 2018-01-22 RX ORDER — HYDROCODONE BITARTRATE AND ACETAMINOPHEN 5; 325 MG/1; MG/1
2 TABLET ORAL AS NEEDED
Status: DISCONTINUED | OUTPATIENT
Start: 2018-01-22 | End: 2018-01-22 | Stop reason: HOSPADM

## 2018-01-22 RX ORDER — SODIUM CHLORIDE, SODIUM LACTATE, POTASSIUM CHLORIDE, CALCIUM CHLORIDE 600; 310; 30; 20 MG/100ML; MG/100ML; MG/100ML; MG/100ML
INJECTION, SOLUTION INTRAVENOUS CONTINUOUS
Status: DISCONTINUED | OUTPATIENT
Start: 2018-01-22 | End: 2018-01-22 | Stop reason: HOSPADM

## 2018-01-22 RX ORDER — HYDROCODONE BITARTRATE AND ACETAMINOPHEN 5; 325 MG/1; MG/1
1 TABLET ORAL EVERY 4 HOURS PRN
Status: DISCONTINUED | OUTPATIENT
Start: 2018-01-22 | End: 2018-01-24

## 2018-01-22 RX ORDER — SODIUM CHLORIDE 0.9 % (FLUSH) 0.9 %
10 SYRINGE (ML) INJECTION AS NEEDED
Status: DISCONTINUED | OUTPATIENT
Start: 2018-01-22 | End: 2018-01-24

## 2018-01-22 RX ORDER — ONDANSETRON 2 MG/ML
4 INJECTION INTRAMUSCULAR; INTRAVENOUS ONCE AS NEEDED
Status: DISCONTINUED | OUTPATIENT
Start: 2018-01-22 | End: 2018-01-22 | Stop reason: HOSPADM

## 2018-01-22 RX ORDER — MORPHINE SULFATE 10 MG/ML
6 INJECTION, SOLUTION INTRAMUSCULAR; INTRAVENOUS EVERY 10 MIN PRN
Status: DISCONTINUED | OUTPATIENT
Start: 2018-01-22 | End: 2018-01-22 | Stop reason: HOSPADM

## 2018-01-22 RX ORDER — SCOLOPAMINE TRANSDERMAL SYSTEM 1 MG/1
1 PATCH, EXTENDED RELEASE TRANSDERMAL
Status: DISCONTINUED | OUTPATIENT
Start: 2018-01-22 | End: 2018-01-25 | Stop reason: HOSPADM

## 2018-01-22 RX ADMIN — ONDANSETRON 4 MG: 2 INJECTION INTRAMUSCULAR; INTRAVENOUS at 08:44:00

## 2018-01-22 RX ADMIN — SODIUM CHLORIDE: 9 INJECTION, SOLUTION INTRAVENOUS at 09:06:00

## 2018-01-22 RX ADMIN — LEVOFLOXACIN 500 MG: 5 INJECTION, SOLUTION INTRAVENOUS at 07:46:00

## 2018-01-22 RX ADMIN — LIDOCAINE HYDROCHLORIDE 50 MG: 10 INJECTION, SOLUTION EPIDURAL; INFILTRATION; INTRACAUDAL; PERINEURAL at 07:50:00

## 2018-01-22 RX ADMIN — MIDAZOLAM HYDROCHLORIDE 2 MG: 1 INJECTION INTRAMUSCULAR; INTRAVENOUS at 07:46:00

## 2018-01-22 RX ADMIN — DEXAMETHASONE SODIUM PHOSPHATE 4 MG: 4 MG/ML VIAL (ML) INJECTION at 07:53:00

## 2018-01-22 RX ADMIN — SODIUM CHLORIDE, SODIUM LACTATE, POTASSIUM CHLORIDE, CALCIUM CHLORIDE: 600; 310; 30; 20 INJECTION, SOLUTION INTRAVENOUS at 07:44:00

## 2018-01-22 NOTE — PLAN OF CARE
Patient/Family Goals    • Patient/Family Long Term Goal Progressing    • Patient/Family Short Term Goal Progressing        No complaints of pain or nausea. Voiding freely, tolerating diet, plan for ultrasound of kidney tonight. plan for discharge tomorrow.

## 2018-01-22 NOTE — RESPIRATORY THERAPY NOTE
.Respiratory Directed Pathway Interventions    Based on RT assessment the patient requires adjunct therapies to support hyperinflation and/or bronchial hygiene. Incentive Spirometry initiated and tolerated well without complication.

## 2018-01-22 NOTE — H&P
[facility] ADULT SHORT STAY HISTORY AND PHYSICAL      IDENTIFYING DATA  Patient is Jill Pires a 66year old female. MRN is [id number]    Primary care physician: Valentine Crowley DO    CHIEF COMPLAINT  No chief complaint on file.       HISTORY OF PRESENT Tena Fernandes on file       MEDICATIONS    No current facility-administered medications on file prior to encounter. Current Outpatient Prescriptions on File Prior to Encounter:  Cholecalciferol (VITAMIN D3) 1000 units Oral Cap Take 1 tablet by mouth daily.  Disp:  Rfl: new diagnosis bladder cancer here for re-staging TURBT. Will also plan for intravesical gemcitibine.       Lalita Summers MD  [place] [address]: [phone]

## 2018-01-22 NOTE — ANESTHESIA POSTPROCEDURE EVALUATION
Patient: Shyanne Gar    Procedure Summary     Date:  01/22/18 Room / Location:  [address] [place] [address] MAIN OR 14 / [address] [place] [address] MAIN OR    Anesthesia Start:  7231 Anesthesia Stop:  6489    Procedures:       CYSTOSCOPY (N/A )      CYSTOSCOPY TRANSURETHRAL RESECTION BLADDER TUMOR (N/

## 2018-01-22 NOTE — BRIEF OP NOTE
BRIEF PROCEDURE NOTE    PATIENT: Bella Camacho; [facility]:[id number]; : 1/15/1955  DATE OF SURGERY: 2018    PREOP DIAGNOSIS:  bladder cancer  POSTOP DIAGNOSIS:  same  PROCEDURE:  cystoscopy with restaging transurethral resection of bladder tumor, left ret

## 2018-01-22 NOTE — ANESTHESIA PREPROCEDURE EVALUATION
Anesthesia PreOp Note    HPI:     Corrie Flynn is a 66year old female who presents for preoperative consultation requested by:  Karolina Diaz MD    Date of Surgery: 1/22/2018    Procedure(s):  CYSTOSCOPY  CYSTOSCOPY TRANSURETHRAL RESECTION BLADDER TUMO M great aunt- dx age 52's   • Cancer Other    • Alcohol and Other Disorders Associated Father    • Cancer Maternal Grandmother    • liver cancer [OTHER] Maternal Grandmother    • aneurysm [OTHER] Maternal Grandmother      aneurysm of stomach   • Pacema reviewed and Nursing notes reviewed    Airway   Mallampati: II  TM distance: >3 FB  Neck ROM: full  Dental - normal exam     Pulmonary - negative ROS and normal exam   Cardiovascular - negative ROS and normal exam    ECG reviewed    Neuro/Psych - negative

## 2018-01-23 ENCOUNTER — APPOINTMENT (OUTPATIENT)
Dept: INTERVENTIONAL RADIOLOGY/VASCULAR | Facility: HOSPITAL | Age: 63
End: 2018-01-23
Attending: UROLOGY
Payer: COMMERCIAL

## 2018-01-23 PROCEDURE — 99219 INITIAL OBSERVATION CARE,LEVL II: CPT | Performed by: INTERNAL MEDICINE

## 2018-01-23 PROCEDURE — 0T763DZ DILATION OF RIGHT URETER WITH INTRALUMINAL DEVICE, PERCUTANEOUS APPROACH: ICD-10-PCS | Performed by: RADIOLOGY

## 2018-01-23 RX ORDER — MIDAZOLAM HYDROCHLORIDE 1 MG/ML
INJECTION INTRAMUSCULAR; INTRAVENOUS
Status: COMPLETED
Start: 2018-01-23 | End: 2018-01-23

## 2018-01-23 RX ORDER — HYDROCODONE BITARTRATE AND ACETAMINOPHEN 5; 325 MG/1; MG/1
TABLET ORAL
Status: DISPENSED
Start: 2018-01-23 | End: 2018-01-24

## 2018-01-23 RX ORDER — CIPROFLOXACIN 2 MG/ML
INJECTION, SOLUTION INTRAVENOUS
Status: COMPLETED
Start: 2018-01-23 | End: 2018-01-23

## 2018-01-23 RX ORDER — HYDROCODONE BITARTRATE AND ACETAMINOPHEN 5; 325 MG/1; MG/1
1 TABLET ORAL EVERY 6 HOURS PRN
Status: DISCONTINUED | OUTPATIENT
Start: 2018-01-23 | End: 2018-01-24

## 2018-01-23 RX ORDER — LIDOCAINE HYDROCHLORIDE 20 MG/ML
INJECTION, SOLUTION EPIDURAL; INFILTRATION; INTRACAUDAL; PERINEURAL
Status: COMPLETED
Start: 2018-01-23 | End: 2018-01-23

## 2018-01-23 RX ORDER — SODIUM CHLORIDE 9 MG/ML
INJECTION, SOLUTION INTRAVENOUS
Status: COMPLETED
Start: 2018-01-23 | End: 2018-01-23

## 2018-01-23 RX ORDER — ONDANSETRON 2 MG/ML
INJECTION INTRAMUSCULAR; INTRAVENOUS
Status: COMPLETED
Start: 2018-01-23 | End: 2018-01-23

## 2018-01-23 RX ORDER — SODIUM CHLORIDE 9 MG/ML
INJECTION, SOLUTION INTRAVENOUS
Status: DISPENSED
Start: 2018-01-23 | End: 2018-01-24

## 2018-01-23 NOTE — OPERATIVE REPORT
[facility]    PATIENT'S NAME: Winifred Parker   ATTENDING PHYSICIAN: Burak Guerin MD   OPERATING PHYSICIAN: Burak Guerin MD   PATIENT ACCOUNT#:   [id number]    LOCATION:  [facility] [address] [facility] #:   [id number]       DATE OF BIRTH:  0 was significant fibrinous exudate over this area. I tried to clear the fibrinous exudate in order to visualize this, but that was unsuccessful. Thus, I proceeded with my restaging using the bipolar loop.   I took down resection bed down to muscle and supe

## 2018-01-23 NOTE — PROGRESS NOTES
John Silva  [id number]     Post procedure/ recovery hand-off report given to [facility]. Patient's vital signs stable, access site dry and intact, no signs and symptoms of bleeding/ hematoma.     Bigg Dyer RN

## 2018-01-23 NOTE — PROGRESS NOTES
[facility] ADULT PROGRESS NOTE    24 HOUR EVENTS  No acute events overnight. SUBJECTIVE  No pain. Nausea controlled. Tolerated diet yesterday. No flank pain.     OBJECTIVE    Vital signs:    01/22/18  1123 01/22/18  1133 01/22/18  2122 01/23/18  0630

## 2018-01-23 NOTE — CONSULTS
[facility]    Report of Consultation    Gerhardt Angelica Patient Status:  Outpatient in a Bed    1/15/1955 MRN [id number]   Location [facility] 4W/SW/SE Attending Adriana Alves MD   Hosp Day # 0 PCP Sara Madrid, DO     Date of Ad Dizziness    Medications:    Current Facility-Administered Medications:   •  lactated ringers infusion, , Intravenous, Continuous  •  Normal Saline Flush 0.9 % injection 10 mL, 10 mL, Intravenous, PRN  •  Enoxaparin Sodium (LOVENOX) 40 MG/0.4ML injection 4 rate and rhythm. Abdomen:  Nondistended,   Nontender. Extremities:  No lower extremity edema noted. Skin: Normal texture and turgor. Laboratory Data:    Lab Results  Component Value Date   INR 1.1 01/23/2018       Imaging:           Impression:   Ther

## 2018-01-23 NOTE — H&P
[facility]    PATIENT'S NAME: Perlita Ponce   ATTENDING PHYSICIAN: Yovany Falcon MD   PATIENT ACCOUNT#:   [de-identified]    LOCATION:  [address] [place] [address] RECORD #:   [id number]       YOB: 1955  ADMISSION DATE:       01/22/2018 [place].    PHYSICAL EXAMINATION:    GENERAL:  The patient is alert and oriented x3 and in no acute distress. VITAL SIGNS:  She is afebrile. Pulse 70, respirations 18, blood pressure 142/73, oxygen saturations 98% on room air.   HEENT:  Oral mucosa christelle

## 2018-01-24 VITALS
RESPIRATION RATE: 18 BRPM | OXYGEN SATURATION: 95 % | SYSTOLIC BLOOD PRESSURE: 107 MMHG | BODY MASS INDEX: 26.46 KG/M2 | HEIGHT: 64 IN | DIASTOLIC BLOOD PRESSURE: 59 MMHG | HEART RATE: 69 BPM | TEMPERATURE: 99 F | WEIGHT: 155 LBS

## 2018-01-24 PROCEDURE — 99217 OBSERVATION CARE DISCHARGE: CPT | Performed by: INTERNAL MEDICINE

## 2018-01-24 RX ORDER — ACETAMINOPHEN 325 MG/1
650 TABLET ORAL EVERY 4 HOURS PRN
Qty: 1 TABLET | Refills: 0 | Status: SHIPPED | COMMUNITY
Start: 2018-01-24

## 2018-01-24 NOTE — PLAN OF CARE
DISCHARGE PLANNING    • Discharge to home or other facility with appropriate resources Completed        PAIN - ADULT    • Verbalizes/displays adequate comfort level or patient's stated pain goal Completed        Patient/Family Goals    • Patient/Family Abebe

## 2018-01-24 NOTE — PROGRESS NOTES
[facility] ADULT PROGRESS NOTE    24 HOUR EVENTS  No acute events overnight. SUBJECTIVE  Pain controlled. Tolerating diet. No N/V/F/C.      OBJECTIVE    Vital signs:    01/23/18  1715 01/23/18  1730 01/23/18  1900 01/24/18  0625   BP:  128/81 111/56 1

## 2018-01-24 NOTE — BRIEF PROCEDURE NOTE
[facility]  Procedure Note    [place] Patient Status:  Outpatient in a Bed    1/15/1955 MRN [id number]   Location [facility] 4W/SW/SE Attending Maninder Solozrano MD   Hosp Day # 0 PCP Josep Kathleen DO     Procedure: Leah Trevino

## 2018-02-19 ENCOUNTER — LAB ENCOUNTER (OUTPATIENT)
Dept: LAB | Facility: HOSPITAL | Age: 63
End: 2018-02-19
Attending: UROLOGY
Payer: COMMERCIAL

## 2018-02-19 DIAGNOSIS — C67.9 BLADDER CANCER (HCC): Primary | ICD-10-CM

## 2018-02-19 PROCEDURE — 87086 URINE CULTURE/COLONY COUNT: CPT

## 2018-02-28 ENCOUNTER — ANESTHESIA EVENT (OUTPATIENT)
Dept: SURGERY | Facility: HOSPITAL | Age: 63
End: 2018-02-28
Payer: COMMERCIAL

## 2018-02-28 ENCOUNTER — ANESTHESIA (OUTPATIENT)
Dept: SURGERY | Facility: HOSPITAL | Age: 63
End: 2018-02-28
Payer: COMMERCIAL

## 2018-02-28 ENCOUNTER — APPOINTMENT (OUTPATIENT)
Dept: GENERAL RADIOLOGY | Facility: HOSPITAL | Age: 63
End: 2018-02-28
Attending: UROLOGY
Payer: COMMERCIAL

## 2018-02-28 ENCOUNTER — HOSPITAL ENCOUNTER (OUTPATIENT)
Facility: HOSPITAL | Age: 63
Setting detail: HOSPITAL OUTPATIENT SURGERY
Discharge: HOME | End: 2018-02-28
Attending: UROLOGY | Admitting: UROLOGY
Payer: COMMERCIAL

## 2018-02-28 ENCOUNTER — SURGERY (OUTPATIENT)
Age: 63
End: 2018-02-28

## 2018-02-28 VITALS
SYSTOLIC BLOOD PRESSURE: 120 MMHG | OXYGEN SATURATION: 100 % | HEART RATE: 70 BPM | RESPIRATION RATE: 16 BRPM | WEIGHT: 158 LBS | DIASTOLIC BLOOD PRESSURE: 73 MMHG | TEMPERATURE: 98 F | BODY MASS INDEX: 26.98 KG/M2 | HEIGHT: 64 IN

## 2018-02-28 PROCEDURE — BT1DYZZ FLUOROSCOPY OF RIGHT KIDNEY, URETER AND BLADDER USING OTHER CONTRAST: ICD-10-PCS | Performed by: UROLOGY

## 2018-02-28 PROCEDURE — 0TP98DZ REMOVAL OF INTRALUMINAL DEVICE FROM URETER, VIA NATURAL OR ARTIFICIAL OPENING ENDOSCOPIC: ICD-10-PCS | Performed by: UROLOGY

## 2018-02-28 PROCEDURE — 74420 UROGRAPHY RTRGR +-KUB: CPT | Performed by: UROLOGY

## 2018-02-28 RX ORDER — SODIUM CHLORIDE, SODIUM LACTATE, POTASSIUM CHLORIDE, CALCIUM CHLORIDE 600; 310; 30; 20 MG/100ML; MG/100ML; MG/100ML; MG/100ML
INJECTION, SOLUTION INTRAVENOUS CONTINUOUS
Status: DISCONTINUED | OUTPATIENT
Start: 2018-02-28 | End: 2018-02-28

## 2018-02-28 RX ORDER — MORPHINE SULFATE 2 MG/ML
2 INJECTION, SOLUTION INTRAMUSCULAR; INTRAVENOUS EVERY 10 MIN PRN
Status: DISCONTINUED | OUTPATIENT
Start: 2018-02-28 | End: 2018-02-28

## 2018-02-28 RX ORDER — PHENAZOPYRIDINE HYDROCHLORIDE 200 MG/1
200 TABLET, FILM COATED ORAL 3 TIMES DAILY PRN
Qty: 12 TABLET | Refills: 0 | Status: SHIPPED | OUTPATIENT
Start: 2018-02-28 | End: 2019-03-28 | Stop reason: ALTCHOICE

## 2018-02-28 RX ORDER — HYDROCODONE BITARTRATE AND ACETAMINOPHEN 5; 325 MG/1; MG/1
1 TABLET ORAL AS NEEDED
Status: DISCONTINUED | OUTPATIENT
Start: 2018-02-28 | End: 2018-02-28

## 2018-02-28 RX ORDER — HYDROCODONE BITARTRATE AND ACETAMINOPHEN 5; 325 MG/1; MG/1
2 TABLET ORAL AS NEEDED
Status: DISCONTINUED | OUTPATIENT
Start: 2018-02-28 | End: 2018-02-28

## 2018-02-28 RX ORDER — ONDANSETRON 2 MG/ML
4 INJECTION INTRAMUSCULAR; INTRAVENOUS ONCE AS NEEDED
Status: DISCONTINUED | OUTPATIENT
Start: 2018-02-28 | End: 2018-02-28

## 2018-02-28 RX ORDER — LIDOCAINE HYDROCHLORIDE 10 MG/ML
INJECTION, SOLUTION EPIDURAL; INFILTRATION; INTRACAUDAL; PERINEURAL AS NEEDED
Status: DISCONTINUED | OUTPATIENT
Start: 2018-02-28 | End: 2018-02-28 | Stop reason: SURG

## 2018-02-28 RX ORDER — FAMOTIDINE 20 MG/1
20 TABLET ORAL ONCE
Status: DISCONTINUED | OUTPATIENT
Start: 2018-02-28 | End: 2018-02-28 | Stop reason: HOSPADM

## 2018-02-28 RX ORDER — LEVOFLOXACIN 5 MG/ML
500 INJECTION, SOLUTION INTRAVENOUS
Status: COMPLETED | OUTPATIENT
Start: 2018-02-28 | End: 2018-02-28

## 2018-02-28 RX ORDER — MORPHINE SULFATE 10 MG/ML
6 INJECTION, SOLUTION INTRAMUSCULAR; INTRAVENOUS EVERY 10 MIN PRN
Status: DISCONTINUED | OUTPATIENT
Start: 2018-02-28 | End: 2018-02-28

## 2018-02-28 RX ORDER — ONDANSETRON 2 MG/ML
INJECTION INTRAMUSCULAR; INTRAVENOUS AS NEEDED
Status: DISCONTINUED | OUTPATIENT
Start: 2018-02-28 | End: 2018-02-28 | Stop reason: SURG

## 2018-02-28 RX ORDER — METOCLOPRAMIDE 10 MG/1
10 TABLET ORAL ONCE
Status: DISCONTINUED | OUTPATIENT
Start: 2018-02-28 | End: 2018-02-28 | Stop reason: HOSPADM

## 2018-02-28 RX ORDER — MIDAZOLAM HYDROCHLORIDE 1 MG/ML
INJECTION INTRAMUSCULAR; INTRAVENOUS AS NEEDED
Status: DISCONTINUED | OUTPATIENT
Start: 2018-02-28 | End: 2018-02-28 | Stop reason: SURG

## 2018-02-28 RX ORDER — MORPHINE SULFATE 4 MG/ML
4 INJECTION, SOLUTION INTRAMUSCULAR; INTRAVENOUS EVERY 10 MIN PRN
Status: DISCONTINUED | OUTPATIENT
Start: 2018-02-28 | End: 2018-02-28

## 2018-02-28 RX ORDER — ACETAMINOPHEN 500 MG
1000 TABLET ORAL ONCE
Status: COMPLETED | OUTPATIENT
Start: 2018-02-28 | End: 2018-02-28

## 2018-02-28 RX ORDER — DEXAMETHASONE SODIUM PHOSPHATE 4 MG/ML
VIAL (ML) INJECTION AS NEEDED
Status: DISCONTINUED | OUTPATIENT
Start: 2018-02-28 | End: 2018-02-28 | Stop reason: SURG

## 2018-02-28 RX ORDER — SCOLOPAMINE TRANSDERMAL SYSTEM 1 MG/1
1 PATCH, EXTENDED RELEASE TRANSDERMAL
Status: DISCONTINUED | OUTPATIENT
Start: 2018-02-28 | End: 2018-03-03 | Stop reason: HOSPADM

## 2018-02-28 RX ORDER — NALOXONE HYDROCHLORIDE 0.4 MG/ML
80 INJECTION, SOLUTION INTRAMUSCULAR; INTRAVENOUS; SUBCUTANEOUS AS NEEDED
Status: DISCONTINUED | OUTPATIENT
Start: 2018-02-28 | End: 2018-02-28

## 2018-02-28 RX ADMIN — MIDAZOLAM HYDROCHLORIDE 2 MG: 1 INJECTION INTRAMUSCULAR; INTRAVENOUS at 09:12:00

## 2018-02-28 RX ADMIN — SODIUM CHLORIDE, SODIUM LACTATE, POTASSIUM CHLORIDE, CALCIUM CHLORIDE: 600; 310; 30; 20 INJECTION, SOLUTION INTRAVENOUS at 09:35:00

## 2018-02-28 RX ADMIN — ONDANSETRON 4 MG: 2 INJECTION INTRAMUSCULAR; INTRAVENOUS at 09:19:00

## 2018-02-28 RX ADMIN — LEVOFLOXACIN 500 MG: 5 INJECTION, SOLUTION INTRAVENOUS at 09:20:00

## 2018-02-28 RX ADMIN — DEXAMETHASONE SODIUM PHOSPHATE 4 MG: 4 MG/ML VIAL (ML) INJECTION at 09:19:00

## 2018-02-28 RX ADMIN — LIDOCAINE HYDROCHLORIDE 50 MG: 10 INJECTION, SOLUTION EPIDURAL; INFILTRATION; INTRACAUDAL; PERINEURAL at 09:19:00

## 2018-02-28 NOTE — ANESTHESIA POSTPROCEDURE EVALUATION
Patient: Gerhardt Angelica    Procedure Summary     Date:  02/28/18 Room / Location:  [address] [place] [address] MAIN OR 14 / [address] [place] [address] MAIN OR    Anesthesia Start:  1363 Anesthesia Stop:      Procedures:       CYSTOSCOPY RETROGRADE (Right )      CYSTOSCOPY STENT INSERTION (N/A ) Diagnosis

## 2018-02-28 NOTE — BRIEF OP NOTE
BRIEF PROCEDURE NOTE    PATIENT: Too Jacobsen; [facility]:[id number]; : 1/15/1955  DATE OF SURGERY: 2018    PREOP DIAGNOSIS:  right ureteral scarring  POSTOP DIAGNOSIS:  same  PROCEDURE:  Cystoscopy with right ureteral stent removal, right retrograde pyel

## 2018-02-28 NOTE — ANESTHESIA PREPROCEDURE EVALUATION
Anesthesia PreOp Note    HPI:     Donny José is a 66year old female who presents for preoperative consultation requested by:  Esdras Castañeda MD    Date of Surgery: 2/28/2018    Procedure(s):  CYSTOSCOPY RETROGRADE  CYSTOSCOPY STENT INSERTION  Indicati scopolamine (TRANSDERM-SCOP) 1.5 mg patch 1 patch Transdermal Q72H Twin Madrigal MD 1 patch at 02/28/18 0830     No current [facility]-ordered outpatient prescriptions on file.       Penicillins             Nausea only, Dizziness    Family History   Problem 02/24/18  1118 02/28/18  0749   BP:  137/73   BP Location:  Right arm   Pulse:  73   Resp:  18   Temp:  (!) 97.5 °F (36.4 °C)   SpO2:  100%   Weight: 70.8 kg (156 lb) 71.7 kg (158 lb)   Height: 1.626 m (5' 4\") 1.626 m (5' 4\")        Anesthesia ROS/Med

## 2018-02-28 NOTE — INTERVAL H&P NOTE
Pre-op Diagnosis: right nydronephrosis    The above referenced H&P was reviewed by Johanny Newman MD on 2/28/2018, the patient was examined and no significant changes have occurred in the patient's condition since the H&P was performed.   I discussed with

## 2018-03-01 NOTE — OPERATIVE REPORT
[facility]    PATIENT'S NAME: Katie Vogel   ATTENDING PHYSICIAN: Karyle Garret, MD   OPERATING PHYSICIAN: Karyle Garret, MD   PATIENT ACCOUNT#:   [id number]    LOCATION:  [place] [address]  MEDICAL RECORD #:   [id number]       DATE OF BI Bentson wire, and there was an efflux of urine seen around the Bentson wire after cannulation. I then placed an open-ended catheter over the Bentson wire.   The wire was then removed, and a gentle retrograde pyelogram was performed that showed no hydroneph

## 2018-05-09 ENCOUNTER — HOSPITAL ENCOUNTER (OUTPATIENT)
Dept: ULTRASOUND IMAGING | Facility: HOSPITAL | Age: 63
Discharge: HOME | End: 2018-05-09
Attending: UROLOGY
Payer: COMMERCIAL

## 2018-05-09 DIAGNOSIS — N13.39 OTHER HYDRONEPHROSIS: ICD-10-CM

## 2018-05-09 DIAGNOSIS — C67.9 BLADDER CANCER (HCC): ICD-10-CM

## 2018-05-09 PROCEDURE — 76770 US EXAM ABDO BACK WALL COMP: CPT | Performed by: UROLOGY

## 2018-07-05 ENCOUNTER — LAB ENCOUNTER (OUTPATIENT)
Dept: LAB | Facility: HOSPITAL | Age: 63
End: 2018-07-05
Attending: UROLOGY
Payer: COMMERCIAL

## 2018-07-05 DIAGNOSIS — C67.9 BLADDER CANCER (HCC): Primary | ICD-10-CM

## 2018-07-05 PROCEDURE — 87086 URINE CULTURE/COLONY COUNT: CPT

## 2018-07-05 PROCEDURE — 88108 CYTOPATH CONCENTRATE TECH: CPT

## 2018-09-18 ENCOUNTER — LAB ENCOUNTER (OUTPATIENT)
Dept: LAB | Facility: HOSPITAL | Age: 63
End: 2018-09-18
Attending: UROLOGY
Payer: COMMERCIAL

## 2018-09-18 DIAGNOSIS — D49.4 BLADDER NEOPLASM: ICD-10-CM

## 2018-09-18 DIAGNOSIS — R31.0 GROSS HEMATURIA: ICD-10-CM

## 2018-09-18 DIAGNOSIS — C67.9 BLADDER CANCER (HCC): Primary | ICD-10-CM

## 2018-09-18 PROCEDURE — 87086 URINE CULTURE/COLONY COUNT: CPT

## 2018-09-18 PROCEDURE — 88108 CYTOPATH CONCENTRATE TECH: CPT

## 2018-09-19 LAB — NON GYNE INTERPRETATION: NEGATIVE

## 2018-10-10 ENCOUNTER — OFFICE VISIT (OUTPATIENT)
Dept: INTERNAL MEDICINE CLINIC | Facility: CLINIC | Age: 63
End: 2018-10-10
Payer: COMMERCIAL

## 2018-10-10 ENCOUNTER — TELEPHONE (OUTPATIENT)
Dept: INTERNAL MEDICINE CLINIC | Facility: CLINIC | Age: 63
End: 2018-10-10

## 2018-10-10 VITALS
OXYGEN SATURATION: 98 % | BODY MASS INDEX: 26.29 KG/M2 | HEIGHT: 64 IN | HEART RATE: 68 BPM | TEMPERATURE: 98 F | DIASTOLIC BLOOD PRESSURE: 86 MMHG | WEIGHT: 154 LBS | SYSTOLIC BLOOD PRESSURE: 126 MMHG

## 2018-10-10 DIAGNOSIS — C67.9 MALIGNANT NEOPLASM OF URINARY BLADDER, UNSPECIFIED SITE (HCC): ICD-10-CM

## 2018-10-10 DIAGNOSIS — L20.9 ATOPIC DERMATITIS, UNSPECIFIED TYPE: ICD-10-CM

## 2018-10-10 DIAGNOSIS — Z00.00 ANNUAL PHYSICAL EXAM: Primary | ICD-10-CM

## 2018-10-10 PROCEDURE — 99396 PREV VISIT EST AGE 40-64: CPT | Performed by: INTERNAL MEDICINE

## 2018-10-10 RX ORDER — BETAMETHASONE DIPROPIONATE 0.5 MG/G
1 CREAM TOPICAL 2 TIMES DAILY
Qty: 45 G | Refills: 0 | Status: SHIPPED | OUTPATIENT
Start: 2018-10-10 | End: 2019-01-09 | Stop reason: ALTCHOICE

## 2018-10-10 NOTE — TELEPHONE ENCOUNTER
Indira Order/Req faxed to [phone] with copy of insurance card upon completion. Copy sent to scanning dept.

## 2018-10-10 NOTE — PROGRESS NOTES
Mona Koyanagi is a 66year old female. Patient presents with:  Physical      HPI:   Mona Koyanagi is a 66year old female who presents for physical examination. Her past medical history includes uterine fibroids, bladder cancer.    She sees Dr. Seda Nam MG Oral Tab Take 2 tablets (650 mg total) by mouth every 4 (four) hours as needed. Disp: 1 tablet Rfl: 0   TURMERIC OR Take by mouth. Disp:  Rfl:    Vitamin B-12 1000 MCG Oral Tab Take 1,000 mcg by mouth daily.  Disp:  Rfl:    Cholecalciferol (VITAMIN D3) 1 cancer) Maternal Grandmother    • Other (aneurysm) Maternal Grandmother         aneurysm of stomach   • Pacemaker Mother         hypertrophic cardiomyopathy   • Hypertension Mother    • Cancer Paternal Grandmother    • Other (leukemia) Paternal Grandmother but did have scarring at the ureteral orifice and had a R antegrade stent placement by IR. This was subsequently removed 2/28/18. She then tolerated induction and BCG. She had a repeat cystoscopy and US 9/2018 with no evidence of recurrence.  She is schedul

## 2018-10-10 NOTE — TELEPHONE ENCOUNTER
Pt was seen for physical today, provided form that requires completion by Dr Juno Rosenberg will complete once pt has labs done  Pt would like to  form when complete  Please call her when available, needs to rec'd by insurance company by end of month

## 2018-10-15 ENCOUNTER — LAB ENCOUNTER (OUTPATIENT)
Dept: LAB | Age: 63
End: 2018-10-15
Attending: INTERNAL MEDICINE
Payer: COMMERCIAL

## 2018-10-15 DIAGNOSIS — C67.9 MALIGNANT NEOPLASM OF URINARY BLADDER, UNSPECIFIED SITE (HCC): ICD-10-CM

## 2018-10-15 DIAGNOSIS — Z00.00 ANNUAL PHYSICAL EXAM: ICD-10-CM

## 2018-10-15 PROCEDURE — 80061 LIPID PANEL: CPT

## 2018-10-15 PROCEDURE — 80053 COMPREHEN METABOLIC PANEL: CPT

## 2018-10-15 PROCEDURE — 85025 COMPLETE CBC W/AUTO DIFF WBC: CPT

## 2018-10-15 PROCEDURE — 36415 COLL VENOUS BLD VENIPUNCTURE: CPT

## 2018-10-16 ENCOUNTER — TELEPHONE (OUTPATIENT)
Dept: INTERNAL MEDICINE CLINIC | Facility: CLINIC | Age: 63
End: 2018-10-16

## 2018-10-16 DIAGNOSIS — E78.5 DYSLIPIDEMIA: Primary | ICD-10-CM

## 2018-10-16 NOTE — TELEPHONE ENCOUNTER
Called patient regarding cholesterol. Left message for patient to call back. Please mail patient results from last year and this years  Left message that her cholesterol is a very mild amount better than last year but still quite high.  I would Rolf Mccullough

## 2018-10-17 ENCOUNTER — TELEPHONE (OUTPATIENT)
Dept: INTERNAL MEDICINE CLINIC | Facility: CLINIC | Age: 63
End: 2018-10-17

## 2018-10-17 NOTE — TELEPHONE ENCOUNTER
To Dr. Mynor Man, please advise on physical form completion. Pt would like to .  Pt had labs completed 10/15

## 2018-10-17 NOTE — TELEPHONE ENCOUNTER
Pt called to follow up on form dropped off with Dr Ramsey Rogers if complete & if she can  today at 10:15 am  [phone]

## 2018-10-17 NOTE — TELEPHONE ENCOUNTER
Please notify patient hat the sample she sent to [facility] could not be processed; they will be sending her a new kit.

## 2018-10-17 NOTE — TELEPHONE ENCOUNTER
Pt very upset as the only copy she had was the original which she dropped off at office. She states there is no way for her to get another copy. I apologized and told her I will do the best I can to find it.  If I can't, we will call insurance to hopefully

## 2018-10-17 NOTE — TELEPHONE ENCOUNTER
Left message for patient that I faxed her form today. Faxed confirmation received. Copy sent to scanning. Original will be left at  for patient to . This was also left on the message for patient.

## 2018-10-17 NOTE — TELEPHONE ENCOUNTER
Pt states she actually sent the kit back as she had decided not to do it.  I called dontrell to cancel order    To Dr. Corina Ceja

## 2018-10-22 NOTE — TELEPHONE ENCOUNTER
Pt called today  Upset because the copy of the form she received is not the right one     Needs original form that was dropped off on the day of her physical Oct 10   Asks if the original form  was found &  faxed   (see Oct 10 telephone encounter)    -  st

## 2018-10-22 NOTE — TELEPHONE ENCOUNTER
To Dr Ricardo---Do you know if patient's original form was ever located?     In TE 10/10, Tio Howell called insurance company to get another form which appears to have been received, filled out, and faxed, but patient states she had dropped off the form when she c

## 2018-10-22 NOTE — TELEPHONE ENCOUNTER
I did find the form-I apologize. I faxed this form today. I will leave this original at the  for her to . If she would like this mailed to her instead, that is ok to do so as well.

## 2018-10-23 ENCOUNTER — TELEPHONE (OUTPATIENT)
Dept: INTERNAL MEDICINE CLINIC | Facility: CLINIC | Age: 63
End: 2018-10-23

## 2018-10-23 NOTE — TELEPHONE ENCOUNTER
Please ask patient why she is not doing the stool test? Is she planning to do the colonoscopy instead?

## 2018-10-28 ENCOUNTER — HOSPITAL ENCOUNTER (OUTPATIENT)
Age: 63
Discharge: HOME | End: 2018-10-28
Payer: COMMERCIAL

## 2018-10-28 VITALS
TEMPERATURE: 98 F | RESPIRATION RATE: 16 BRPM | SYSTOLIC BLOOD PRESSURE: 146 MMHG | BODY MASS INDEX: 25.61 KG/M2 | HEIGHT: 64 IN | OXYGEN SATURATION: 98 % | WEIGHT: 150 LBS | HEART RATE: 83 BPM | DIASTOLIC BLOOD PRESSURE: 72 MMHG

## 2018-10-28 DIAGNOSIS — R21 RASH: Primary | ICD-10-CM

## 2018-10-28 PROCEDURE — 99204 OFFICE O/P NEW MOD 45 MIN: CPT

## 2018-10-28 PROCEDURE — 99213 OFFICE O/P EST LOW 20 MIN: CPT

## 2018-10-28 RX ORDER — KETOCONAZOLE 20 MG/G
CREAM TOPICAL
Qty: 30 G | Refills: 0 | Status: SHIPPED | OUTPATIENT
Start: 2018-10-28 | End: 2019-01-09 | Stop reason: ALTCHOICE

## 2018-10-28 NOTE — ED PROVIDER NOTES
Patient presents with:  Rash Skin Problem (integumentary)      HPI:     Too Jacobsen is a 66year old female who presents today with a chief complaint of an intermittent rash to her bilateral ankles.   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 No joint pain.     MDM/Assessment/Plan:   Orders for this encounter:    Orders Placed This Encounter      ketoconazole 2 % External Cream          Sig: Apply to rash BID prn          Dispense:  30 g          Refill:  0      La

## 2018-10-28 NOTE — ED INITIAL ASSESSMENT (HPI)
PATIENT HAD A PHYSICAL WITH HER PCP ON 10/10. SHE HAD A RASH ON HER LEFT ANKLE  AT THAT TIME AND SHE WAS TREATED WITH BETAMETHASONE AND PER EMR WAS INSTRUCTED TO TAKE TWICE DAILY FOR 5-7 DAYS.   PATIENT NOW WITH RED ITCHY RASH TO BILATERAL ANKLES,  PATIENT

## 2018-11-02 NOTE — TELEPHONE ENCOUNTER
As FYI to DR. FLORES - patient called back -relayed DR. FLORES message - patient will not do any of the test for now

## 2018-11-02 NOTE — TELEPHONE ENCOUNTER
LMTCB on mobile. Attempted to also call home number - no answer and unable to leave VM. Routed to Dr. Zainab Islas as an Evan Low.

## 2018-12-04 NOTE — DISCHARGE SUMMARY
DISCHARGE SUMMARY      ADMISSION DATE:       01/22/2018  DISCHARGE DATE:        01/24/2018    DISCHARGE DX:  Bladder ca s/p restaging TURBT by Dr. Carver Poles  Ureteral stenosis with subsequent R hydronephrosis 2/2 ureteral scarring s/p placement of anterogra Detail Level: Detailed Ruy.       OBJECTIVE:  Vital signs in last 24 hours:  /59 (BP Location: Right arm)   Pulse 69   Temp 98.6 °F (37 °C) (Oral)   Resp 18   Ht 5' 4\" (1.626 m)   Wt 155 lb (70.3 kg)   LMP 12/10/2009   SpO2 95%   BMI 26.61 kg/m²     Intake/Output: Plan: No sign of recurrence on exam today mg 25 mg Oral Nightly PRN   Or      DiphenhydrAMINE HCl (BENADRYL) injection 12.5 mg 12.5 mg Intravenous Nightly PRN   docusate sodium (COLACE) cap 100 mg 100 mg Oral BID   magnesium hydroxide (MILK OF MAGNESIA) 400 MG/5ML suspension 30 mL 30 mL Oral Daily Plan: Lesion removed with electric desiccation

## 2018-12-26 ENCOUNTER — LAB ENCOUNTER (OUTPATIENT)
Dept: LAB | Facility: HOSPITAL | Age: 63
End: 2018-12-26
Attending: UROLOGY
Payer: COMMERCIAL

## 2018-12-26 DIAGNOSIS — C67.9 BLADDER CANCER (HCC): Primary | ICD-10-CM

## 2018-12-26 DIAGNOSIS — R31.0 GROSS HEMATURIA: ICD-10-CM

## 2018-12-26 PROCEDURE — 88108 CYTOPATH CONCENTRATE TECH: CPT

## 2018-12-26 PROCEDURE — 87086 URINE CULTURE/COLONY COUNT: CPT

## 2019-03-28 ENCOUNTER — APPOINTMENT (OUTPATIENT)
Dept: ULTRASOUND IMAGING | Facility: HOSPITAL | Age: 64
DRG: 418 | End: 2019-03-28
Attending: EMERGENCY MEDICINE
Payer: COMMERCIAL

## 2019-03-28 ENCOUNTER — HOSPITAL ENCOUNTER (INPATIENT)
Facility: HOSPITAL | Age: 64
LOS: 2 days | Discharge: HOME | DRG: 418 | End: 2019-03-30
Attending: EMERGENCY MEDICINE | Admitting: INTERNAL MEDICINE
Payer: COMMERCIAL

## 2019-03-28 ENCOUNTER — APPOINTMENT (OUTPATIENT)
Dept: CT IMAGING | Facility: HOSPITAL | Age: 64
DRG: 418 | End: 2019-03-28
Attending: EMERGENCY MEDICINE
Payer: COMMERCIAL

## 2019-03-28 DIAGNOSIS — K81.0 ACUTE CHOLECYSTITIS: Primary | ICD-10-CM

## 2019-03-28 PROCEDURE — 74177 CT ABD & PELVIS W/CONTRAST: CPT | Performed by: EMERGENCY MEDICINE

## 2019-03-28 PROCEDURE — 99222 1ST HOSP IP/OBS MODERATE 55: CPT | Performed by: INTERNAL MEDICINE

## 2019-03-28 PROCEDURE — 76705 ECHO EXAM OF ABDOMEN: CPT | Performed by: EMERGENCY MEDICINE

## 2019-03-28 RX ORDER — KETOROLAC TROMETHAMINE 15 MG/ML
15 INJECTION, SOLUTION INTRAMUSCULAR; INTRAVENOUS ONCE
Status: COMPLETED | OUTPATIENT
Start: 2019-03-28 | End: 2019-03-28

## 2019-03-28 RX ORDER — SODIUM CHLORIDE 9 MG/ML
INJECTION, SOLUTION INTRAVENOUS CONTINUOUS
Status: DISCONTINUED | OUTPATIENT
Start: 2019-03-28 | End: 2019-03-30

## 2019-03-28 RX ORDER — ONDANSETRON 2 MG/ML
4 INJECTION INTRAMUSCULAR; INTRAVENOUS ONCE
Status: COMPLETED | OUTPATIENT
Start: 2019-03-28 | End: 2019-03-28

## 2019-03-28 RX ORDER — ACETAMINOPHEN 500 MG
500 TABLET ORAL EVERY 6 HOURS PRN
Status: DISCONTINUED | OUTPATIENT
Start: 2019-03-28 | End: 2019-03-30

## 2019-03-28 RX ORDER — HYDROMORPHONE HYDROCHLORIDE 1 MG/ML
0.4 INJECTION, SOLUTION INTRAMUSCULAR; INTRAVENOUS; SUBCUTANEOUS EVERY 2 HOUR PRN
Status: DISCONTINUED | OUTPATIENT
Start: 2019-03-28 | End: 2019-03-30

## 2019-03-28 RX ORDER — ACETAMINOPHEN 500 MG
1000 TABLET ORAL EVERY 6 HOURS PRN
Status: DISCONTINUED | OUTPATIENT
Start: 2019-03-28 | End: 2019-03-30

## 2019-03-28 RX ORDER — ONDANSETRON 2 MG/ML
4 INJECTION INTRAMUSCULAR; INTRAVENOUS EVERY 4 HOURS PRN
Status: DISCONTINUED | OUTPATIENT
Start: 2019-03-28 | End: 2019-03-30

## 2019-03-28 RX ORDER — SODIUM CHLORIDE 9 MG/ML
INJECTION, SOLUTION INTRAVENOUS ONCE
Status: COMPLETED | OUTPATIENT
Start: 2019-03-28 | End: 2019-03-28

## 2019-03-28 RX ORDER — HYDROMORPHONE HYDROCHLORIDE 1 MG/ML
0.2 INJECTION, SOLUTION INTRAMUSCULAR; INTRAVENOUS; SUBCUTANEOUS EVERY 2 HOUR PRN
Status: DISCONTINUED | OUTPATIENT
Start: 2019-03-28 | End: 2019-03-30

## 2019-03-28 RX ORDER — MORPHINE SULFATE 4 MG/ML
4 INJECTION, SOLUTION INTRAMUSCULAR; INTRAVENOUS ONCE
Status: COMPLETED | OUTPATIENT
Start: 2019-03-28 | End: 2019-03-28

## 2019-03-28 NOTE — CONSULTS
\\\  [facility] ADULT HISTORY AND PHYSICAL      IDENTIFYING DATA  Patient is Qing Wen a 60year old female.  MRN is [id number]    Admitting physician: Ami Weiner MD  Primary care physician: Caroline Fiore MD    CHIEF COMPLAINT  Patient present MAIN OR   • CYSTOSCOPY TRANSURETHRAL RESECTION BLADDER TUMOR Bilateral 12/18/2017    Performed by Linda Rolle MD at [address] [place] [address] MAIN OR   • Miteshter      bladder tumor removed   • D & C  03/2017   • DILATION/CURETTAGE,DIAGNOSTIC     • Not on file        Attends Alevism service: Not on file        Active member of club or organization: Not on file        Attends meetings of clubs or organizations: Not on file        Relationship status: Not on file      Intimate partner violence: Pulse: 73  75 75   Resp: 16  16 18   Temp:  98.1 °F (36.7 °C)  97.9 °F (36.6 °C)   TempSrc:  Oral  Oral   SpO2: 98%  99% 96%   Weight:    158 lb 1.6 oz (71.7 kg)   Height:    5' 4\" (1.626 m)       Constitutional: General appearance: appears well, alert

## 2019-03-28 NOTE — H&P
[facility] HOSP - [facility]    History and Physical    Chung Ag Patient Status:  Inpatient    1/15/1955 MRN [id number]   Location [facility] 4W/SW/SE Attending Brent Goode MD   Hosp Day # 0 PCP Nitin Chase MD     Date:  3/28/2019  D gallstone impacted in the neck of the gallbladder. There was mild Arce sign. Common bile duct was dilated at 10 mm. No definite choledocholithiasis was identified.     Patient received IV Toradol and morphine in the emergency room with improvement in h cancer) Maternal Grandmother    • Other (aneurysm) Maternal Grandmother         aneurysm of stomach   • Pacemaker Mother         hypertrophic cardiomyopathy   • Hypertension Mother    • Cancer Paternal Grandmother    • Other (leukemia) Paternal Grandmother 3.5 03/28/2019     03/28/2019    CO2 23.0 03/28/2019     (H) 03/28/2019    CA 10.0 03/28/2019    ALB 4.2 03/28/2019    ALKPHO 104 03/28/2019    BILT 0.3 03/28/2019    TP 7.5 03/28/2019    AST 19 03/28/2019    ALT 31 03/28/2019    INR 1.1 01/23 cholelithiasis  Liver enzymes normal; dilated CBD. Discussed with Dr. Nona Flanagan. Plan for lap ccy tomorrow with IOC. If + stones, may need ERCP. On Ertapenem, IVF's.   Clear liquids today    R hydronephrosis  Pt's bladder cancer was near the R ureteral o

## 2019-03-28 NOTE — ED PROVIDER NOTES
Patient endorsed to me from Dr. Foreign Jones. Patient with labs and imaging pending. Labs and urinalysis were unremarkable. CT scan concerning for cholelithiasis.   Given location of her pain and tenderness ultrasound was obtained and is consistent with acut

## 2019-03-28 NOTE — CONSULTS
[address] [facility]  ROS:8/80/8056  XPW:[id number]  LOS:0    Date of Admission:  3/28/2019  Date of Consult:  3/28/2019     Italo Patterson MD at [facility] OR   • CYSTOSCOPY RETROGRADE Bilateral 1/22/2018    Performed by Albaro Moore MD at [facility] OR   • [place] N/A 2/28/2018    Performed by Albaro Moore MD at [place] [address] negative    Physical Exam:    03/28/19  0735 03/28/19  0745 03/28/19  1030 03/28/19  1042   BP: 146/72 146/72 147/75    Pulse: 77 77 73    Resp: 16 16 16    Temp: (!) 100.5 °F (38.1 °C)   98.1 °F (36.7 °C)   TempSrc: Temporal   Oral   SpO2: 100% 99% 98% Harjeet Quintana MD on 3/28/2019 at 9:23          Ct Abdomen+pelvis(contrast Only)(cpt=74177)    Result Date: 3/28/2019  CONCLUSION:  1. Diffuse right-sided hydroureteronephrosis to the level of the right ureterovesical junction.  Although this could to: the need to convert to an open surgery, possible need for postoperative surgical procedure/ERCP, bile leak, bile duct injury, excessive bleeding, infection, post-cholecystectomy syndrome, diarrhea, damage to surrounding structures, etc. The patient con

## 2019-03-28 NOTE — CONSULTS
[facility] HOSP - [facility]    Report of Consultation    Mona Koyanagi Patient Status:  Emergency    1/15/1955 MRN [id number]   Location [address] Attending [facility] Day # 0 PCP Manda Steinberg MD RESECTION BLADDER TUMOR Bilateral 12/18/2017    Performed by Lg Patiño MD at [address] [place] [address]    • Nidhi      bladder tumor removed   • D & C  03/2017   • DILATION/CURETTAGE,DIAGNOSTIC     • HYSTEROSCOPY  03/2017   • HYSTEROSCO (65.8 kg), last menstrual period 12/10/2009, SpO2 98 %, not currently breastfeeding.   GENERAL: well developed, in no apparent distress  SKIN: no rashes,no suspicious lesions  HEENT: atraumatic, normocephalic, oropharynx clear  NECK: supple,no adenopathy, n gallbladder and minimal thickening of the gallbladder wall. Mild Arce's sign. 3. Mildly dilated extrahepatic bile duct. Choledocholithiasis not identified. MRCP may be helpful for further evaluation of the biliary system.     Dictated by (CST): [facility] collection, ductal dilatation, or atrophy. SPLEEN: No enlargement. ADRENALS:   No defined mass or abnormal enlargement. KIDNEYS:   Asymmetric left renal atrophy is demonstrated. There appears to be left renal cortical scarring.  Diffuse right-sided hydro sacroiliac joints bilaterally. Degenerative changes of the shoulders and pubic symphysis are apparent. ABDOMINAL WALL: There is a small fat-containing umbilical hernia.  There is transversely oriented lower abdominopelvic wall scarring, suggestive of prior

## 2019-03-28 NOTE — ED INITIAL ASSESSMENT (HPI)
Pt c/o low back pain that radiates to lower abdomen x1 day. +nausea, no vomiting, no diarrhea. Denies urinary symptoms. Denies CP/CYNDI.

## 2019-03-28 NOTE — ED PROVIDER NOTES
Patient Seen in: [facility] Emergency Department    History   Patient presents with:  Abdomen/Flank Pain (GI/)    Stated Complaint:     HPI    82-year-old female with a history of  and prior resected bladder tumor who presents for compl Systems    Positive for stated complaint:   Other systems are as noted in HPI. Constitutional and vital signs reviewed. All other systems reviewed and negative except as noted above.     Physical Exam     ED Triage Vitals [03/28/19 0513]   BP (!) 171/ RAINBOW DRAW BLUE   RAINBOW DRAW LAVENDER   RAINBOW DRAW LIGHT GREEN   RAINBOW DRAW GOLD   CBC W/ DIFFERENTIAL                MDM   Endorsed to  Dr. Thuy Hsu pending labs, urine and CT.             Disposition and Plan     Clinical Impression:  Abdominal

## 2019-03-29 ENCOUNTER — ANESTHESIA (OUTPATIENT)
Dept: SURGERY | Facility: HOSPITAL | Age: 64
DRG: 418 | End: 2019-03-29
Payer: COMMERCIAL

## 2019-03-29 ENCOUNTER — ANESTHESIA EVENT (OUTPATIENT)
Dept: SURGERY | Facility: HOSPITAL | Age: 64
DRG: 418 | End: 2019-03-29
Payer: COMMERCIAL

## 2019-03-29 ENCOUNTER — APPOINTMENT (OUTPATIENT)
Dept: GENERAL RADIOLOGY | Facility: HOSPITAL | Age: 64
DRG: 418 | End: 2019-03-29
Attending: SURGERY
Payer: COMMERCIAL

## 2019-03-29 ENCOUNTER — APPOINTMENT (OUTPATIENT)
Dept: NUCLEAR MEDICINE | Facility: HOSPITAL | Age: 64
DRG: 418 | End: 2019-03-29
Attending: UROLOGY
Payer: COMMERCIAL

## 2019-03-29 PROCEDURE — BF141ZZ FLUOROSCOPY OF GALLBLADDER, BILE DUCTS AND PANCREATIC DUCTS USING LOW OSMOLAR CONTRAST: ICD-10-PCS | Performed by: SURGERY

## 2019-03-29 PROCEDURE — 99232 SBSQ HOSP IP/OBS MODERATE 35: CPT | Performed by: INTERNAL MEDICINE

## 2019-03-29 PROCEDURE — 0FT44ZZ RESECTION OF GALLBLADDER, PERCUTANEOUS ENDOSCOPIC APPROACH: ICD-10-PCS | Performed by: SURGERY

## 2019-03-29 PROCEDURE — 74300 X-RAY BILE DUCTS/PANCREAS: CPT | Performed by: SURGERY

## 2019-03-29 PROCEDURE — 78708 K FLOW/FUNCT IMAGE W/DRUG: CPT | Performed by: UROLOGY

## 2019-03-29 RX ORDER — MIDAZOLAM HYDROCHLORIDE 1 MG/ML
INJECTION INTRAMUSCULAR; INTRAVENOUS AS NEEDED
Status: DISCONTINUED | OUTPATIENT
Start: 2019-03-29 | End: 2019-03-29 | Stop reason: SURG

## 2019-03-29 RX ORDER — BUPIVACAINE HYDROCHLORIDE AND EPINEPHRINE 2.5; 5 MG/ML; UG/ML
INJECTION, SOLUTION INFILTRATION; PERINEURAL AS NEEDED
Status: DISCONTINUED | OUTPATIENT
Start: 2019-03-29 | End: 2019-03-29 | Stop reason: HOSPADM

## 2019-03-29 RX ORDER — METOCLOPRAMIDE HYDROCHLORIDE 5 MG/ML
INJECTION INTRAMUSCULAR; INTRAVENOUS AS NEEDED
Status: DISCONTINUED | OUTPATIENT
Start: 2019-03-29 | End: 2019-03-29 | Stop reason: SURG

## 2019-03-29 RX ORDER — HYDROCODONE BITARTRATE AND ACETAMINOPHEN 5; 325 MG/1; MG/1
2 TABLET ORAL EVERY 4 HOURS PRN
Status: DISCONTINUED | OUTPATIENT
Start: 2019-03-29 | End: 2019-03-30

## 2019-03-29 RX ORDER — DEXAMETHASONE SODIUM PHOSPHATE 4 MG/ML
VIAL (ML) INJECTION AS NEEDED
Status: DISCONTINUED | OUTPATIENT
Start: 2019-03-29 | End: 2019-03-29 | Stop reason: SURG

## 2019-03-29 RX ORDER — MORPHINE SULFATE 4 MG/ML
4 INJECTION, SOLUTION INTRAMUSCULAR; INTRAVENOUS EVERY 10 MIN PRN
Status: DISCONTINUED | OUTPATIENT
Start: 2019-03-29 | End: 2019-03-29 | Stop reason: HOSPADM

## 2019-03-29 RX ORDER — HYDROCODONE BITARTRATE AND ACETAMINOPHEN 5; 325 MG/1; MG/1
2 TABLET ORAL AS NEEDED
Status: DISCONTINUED | OUTPATIENT
Start: 2019-03-29 | End: 2019-03-29 | Stop reason: HOSPADM

## 2019-03-29 RX ORDER — MORPHINE SULFATE 10 MG/ML
6 INJECTION, SOLUTION INTRAMUSCULAR; INTRAVENOUS EVERY 10 MIN PRN
Status: DISCONTINUED | OUTPATIENT
Start: 2019-03-29 | End: 2019-03-29 | Stop reason: HOSPADM

## 2019-03-29 RX ORDER — LIDOCAINE HYDROCHLORIDE 10 MG/ML
INJECTION, SOLUTION EPIDURAL; INFILTRATION; INTRACAUDAL; PERINEURAL AS NEEDED
Status: DISCONTINUED | OUTPATIENT
Start: 2019-03-29 | End: 2019-03-29 | Stop reason: SURG

## 2019-03-29 RX ORDER — SODIUM PHOSPHATE, DIBASIC AND SODIUM PHOSPHATE, MONOBASIC 7; 19 G/133ML; G/133ML
1 ENEMA RECTAL ONCE AS NEEDED
Status: DISCONTINUED | OUTPATIENT
Start: 2019-03-29 | End: 2019-03-30

## 2019-03-29 RX ORDER — SODIUM CHLORIDE, SODIUM LACTATE, POTASSIUM CHLORIDE, CALCIUM CHLORIDE 600; 310; 30; 20 MG/100ML; MG/100ML; MG/100ML; MG/100ML
INJECTION, SOLUTION INTRAVENOUS CONTINUOUS
Status: DISCONTINUED | OUTPATIENT
Start: 2019-03-29 | End: 2019-03-29 | Stop reason: HOSPADM

## 2019-03-29 RX ORDER — NALOXONE HYDROCHLORIDE 0.4 MG/ML
80 INJECTION, SOLUTION INTRAMUSCULAR; INTRAVENOUS; SUBCUTANEOUS AS NEEDED
Status: DISCONTINUED | OUTPATIENT
Start: 2019-03-29 | End: 2019-03-29 | Stop reason: HOSPADM

## 2019-03-29 RX ORDER — NEOSTIGMINE METHYLSULFATE 0.5 MG/ML
INJECTION INTRAVENOUS AS NEEDED
Status: DISCONTINUED | OUTPATIENT
Start: 2019-03-29 | End: 2019-03-29 | Stop reason: SURG

## 2019-03-29 RX ORDER — HEPARIN SODIUM 1000 [USP'U]/ML
INJECTION, SOLUTION INTRAVENOUS; SUBCUTANEOUS AS NEEDED
Status: DISCONTINUED | OUTPATIENT
Start: 2019-03-29 | End: 2019-03-29 | Stop reason: HOSPADM

## 2019-03-29 RX ORDER — HEPARIN SODIUM 5000 [USP'U]/ML
5000 INJECTION, SOLUTION INTRAVENOUS; SUBCUTANEOUS EVERY 8 HOURS SCHEDULED
Status: DISCONTINUED | OUTPATIENT
Start: 2019-03-29 | End: 2019-03-30

## 2019-03-29 RX ORDER — HYDROCODONE BITARTRATE AND ACETAMINOPHEN 5; 325 MG/1; MG/1
1-2 TABLET ORAL EVERY 6 HOURS PRN
Qty: 20 TABLET | Refills: 0 | Status: SHIPPED | OUTPATIENT
Start: 2019-03-29 | End: 2019-04-04 | Stop reason: ALTCHOICE

## 2019-03-29 RX ORDER — SCOLOPAMINE TRANSDERMAL SYSTEM 1 MG/1
1 PATCH, EXTENDED RELEASE TRANSDERMAL
Status: DISCONTINUED | OUTPATIENT
Start: 2019-03-29 | End: 2019-04-01 | Stop reason: HOSPADM

## 2019-03-29 RX ORDER — PHENYLEPHRINE HCL 10 MG/ML
VIAL (ML) INJECTION AS NEEDED
Status: DISCONTINUED | OUTPATIENT
Start: 2019-03-29 | End: 2019-03-29 | Stop reason: SURG

## 2019-03-29 RX ORDER — MORPHINE SULFATE 2 MG/ML
2 INJECTION, SOLUTION INTRAMUSCULAR; INTRAVENOUS EVERY 10 MIN PRN
Status: DISCONTINUED | OUTPATIENT
Start: 2019-03-29 | End: 2019-03-29 | Stop reason: HOSPADM

## 2019-03-29 RX ORDER — BISACODYL 10 MG
10 SUPPOSITORY, RECTAL RECTAL
Status: DISCONTINUED | OUTPATIENT
Start: 2019-03-29 | End: 2019-03-30

## 2019-03-29 RX ORDER — ONDANSETRON 2 MG/ML
4 INJECTION INTRAMUSCULAR; INTRAVENOUS ONCE AS NEEDED
Status: DISCONTINUED | OUTPATIENT
Start: 2019-03-29 | End: 2019-03-29 | Stop reason: HOSPADM

## 2019-03-29 RX ORDER — DOCUSATE SODIUM 100 MG/1
100 CAPSULE, LIQUID FILLED ORAL 2 TIMES DAILY
Status: DISCONTINUED | OUTPATIENT
Start: 2019-03-29 | End: 2019-03-30

## 2019-03-29 RX ORDER — METRONIDAZOLE 500 MG/1
500 TABLET ORAL 2 TIMES DAILY
Qty: 10 TABLET | Refills: 0 | Status: SHIPPED | OUTPATIENT
Start: 2019-03-29 | End: 2019-04-03

## 2019-03-29 RX ORDER — ONDANSETRON 2 MG/ML
INJECTION INTRAMUSCULAR; INTRAVENOUS AS NEEDED
Status: DISCONTINUED | OUTPATIENT
Start: 2019-03-29 | End: 2019-03-29 | Stop reason: SURG

## 2019-03-29 RX ORDER — ACETAMINOPHEN 10 MG/ML
1000 INJECTION, SOLUTION INTRAVENOUS ONCE
Status: COMPLETED | OUTPATIENT
Start: 2019-03-29 | End: 2019-03-29

## 2019-03-29 RX ORDER — CIPROFLOXACIN 500 MG/1
500 TABLET, FILM COATED ORAL 2 TIMES DAILY
Qty: 10 TABLET | Refills: 0 | Status: SHIPPED | OUTPATIENT
Start: 2019-03-29 | End: 2019-04-03

## 2019-03-29 RX ORDER — GLYCOPYRROLATE 0.2 MG/ML
INJECTION INTRAMUSCULAR; INTRAVENOUS AS NEEDED
Status: DISCONTINUED | OUTPATIENT
Start: 2019-03-29 | End: 2019-03-29 | Stop reason: SURG

## 2019-03-29 RX ORDER — ROCURONIUM BROMIDE 10 MG/ML
INJECTION, SOLUTION INTRAVENOUS AS NEEDED
Status: DISCONTINUED | OUTPATIENT
Start: 2019-03-29 | End: 2019-03-29 | Stop reason: SURG

## 2019-03-29 RX ORDER — METOCLOPRAMIDE HYDROCHLORIDE 5 MG/ML
10 INJECTION INTRAMUSCULAR; INTRAVENOUS EVERY 6 HOURS PRN
Status: DISCONTINUED | OUTPATIENT
Start: 2019-03-29 | End: 2019-03-30

## 2019-03-29 RX ORDER — SODIUM CHLORIDE, SODIUM LACTATE, POTASSIUM CHLORIDE, CALCIUM CHLORIDE 600; 310; 30; 20 MG/100ML; MG/100ML; MG/100ML; MG/100ML
INJECTION, SOLUTION INTRAVENOUS CONTINUOUS PRN
Status: DISCONTINUED | OUTPATIENT
Start: 2019-03-29 | End: 2019-03-29 | Stop reason: SURG

## 2019-03-29 RX ORDER — FUROSEMIDE 10 MG/ML
INJECTION INTRAMUSCULAR; INTRAVENOUS
Status: DISPENSED
Start: 2019-03-29 | End: 2019-03-29

## 2019-03-29 RX ORDER — HYDROCODONE BITARTRATE AND ACETAMINOPHEN 5; 325 MG/1; MG/1
1 TABLET ORAL EVERY 4 HOURS PRN
Status: DISCONTINUED | OUTPATIENT
Start: 2019-03-29 | End: 2019-03-30

## 2019-03-29 RX ORDER — HYDROCODONE BITARTRATE AND ACETAMINOPHEN 5; 325 MG/1; MG/1
1 TABLET ORAL AS NEEDED
Status: DISCONTINUED | OUTPATIENT
Start: 2019-03-29 | End: 2019-03-29 | Stop reason: HOSPADM

## 2019-03-29 RX ORDER — POLYETHYLENE GLYCOL 3350 17 G/17G
17 POWDER, FOR SOLUTION ORAL DAILY PRN
Status: DISCONTINUED | OUTPATIENT
Start: 2019-03-29 | End: 2019-03-30

## 2019-03-29 RX ADMIN — GLYCOPYRROLATE 0.6 MG: 0.2 INJECTION INTRAMUSCULAR; INTRAVENOUS at 12:42:00

## 2019-03-29 RX ADMIN — PHENYLEPHRINE HCL 100 MCG: 10 MG/ML VIAL (ML) INJECTION at 11:09:00

## 2019-03-29 RX ADMIN — PHENYLEPHRINE HCL 100 MCG: 10 MG/ML VIAL (ML) INJECTION at 11:07:00

## 2019-03-29 RX ADMIN — DEXAMETHASONE SODIUM PHOSPHATE 4 MG: 4 MG/ML VIAL (ML) INJECTION at 11:11:00

## 2019-03-29 RX ADMIN — ROCURONIUM BROMIDE 40 MG: 10 INJECTION, SOLUTION INTRAVENOUS at 11:05:00

## 2019-03-29 RX ADMIN — SODIUM CHLORIDE, SODIUM LACTATE, POTASSIUM CHLORIDE, CALCIUM CHLORIDE: 600; 310; 30; 20 INJECTION, SOLUTION INTRAVENOUS at 11:05:00

## 2019-03-29 RX ADMIN — METOCLOPRAMIDE HYDROCHLORIDE 10 MG: 5 INJECTION INTRAMUSCULAR; INTRAVENOUS at 12:29:00

## 2019-03-29 RX ADMIN — MIDAZOLAM HYDROCHLORIDE 2 MG: 1 INJECTION INTRAMUSCULAR; INTRAVENOUS at 11:01:00

## 2019-03-29 RX ADMIN — SODIUM CHLORIDE, SODIUM LACTATE, POTASSIUM CHLORIDE, CALCIUM CHLORIDE: 600; 310; 30; 20 INJECTION, SOLUTION INTRAVENOUS at 12:30:00

## 2019-03-29 RX ADMIN — ONDANSETRON 4 MG: 2 INJECTION INTRAMUSCULAR; INTRAVENOUS at 12:29:00

## 2019-03-29 RX ADMIN — LIDOCAINE HYDROCHLORIDE 50 MG: 10 INJECTION, SOLUTION EPIDURAL; INFILTRATION; INTRACAUDAL; PERINEURAL at 11:05:00

## 2019-03-29 RX ADMIN — NEOSTIGMINE METHYLSULFATE 3 MG: 0.5 INJECTION INTRAVENOUS at 12:42:00

## 2019-03-29 NOTE — ANESTHESIA PREPROCEDURE EVALUATION
Anesthesia PreOp Note    HPI:     Jeannette Aragon is a 60year old female who presents for preoperative consultation requested by: Tiffanie Bennett MD    Date of Surgery: 3/28/2019 - 3/29/2019    Procedure(s):  LAPAROSCOPIC CHOLECYSTECTOMY  Indication: acut mouth daily. Vital Protein Collagen   Disp:  Rfl:  3/27/2019 at Unknown time   acetaminophen 325 MG Oral Tab Take 2 tablets (650 mg total) by mouth every 4 (four) hours as needed.  Disp: 1 tablet Rfl: 0 3/27/2019 at Unknown time   TURMERIC OR Take 1 capsule Pain. No driving and no alcohol while using this medication. No tylenol or tylenol containing products while using this medication.  Disp: 20 tablet Rfl: 0         Penicillins             NAUSEA ONLY, DIZZINESS    Family History   Problem Relation Age of On current or ex partner: Not on file        Emotionally abused: Not on file        Physically abused: Not on file        Forced sexual activity: Not on file    Other Topics      Concerns:         Service: Not Asked        Blood Transfusions: Not Aske complications   Airway   Mallampati: II  Neck ROM: full  Dental - normal exam     Pulmonary - negative ROS and normal exam   Cardiovascular - negative ROS and normal exam    Neuro/Psych - negative ROS     GI/Hepatic/Renal - negative ROS     Endo/Other - ne

## 2019-03-29 NOTE — ANESTHESIA POSTPROCEDURE EVALUATION
Patient: Jill Pires    Procedure Summary     Date:  03/29/19 Room / Location:  [address] [place] [address] MAIN OR 02 / [address] [place] [address] MAIN OR    Anesthesia Start:  1059 Anesthesia Stop:  2701    Procedure:  LAPAROSCOPIC CHOLECYSTECTOMY (N/A ) Diagnosis:  (acute cholelithaisis)    Surgeon

## 2019-03-29 NOTE — PROGRESS NOTES
[facility] ADULT PROGRESS NOTE    24 HOUR EVENTS  No acute events overnight. SUBJECTIVE  Complains of right hip pain this AM.  No N/V/F/C.     OBJECTIVE    Vital signs:    03/28/19  1210 03/28/19  1251 03/28/19  2034 03/29/19  0459   BP: 150/77 122/65 1 stricture recurrence vs reflux. Currently asymptomatic, renal function stable. Lasix renal scan to be done today to assess for physiologic obstruction.      Thank you for this consult.     Salo Valdez MD  [facility]  O: [phone]

## 2019-03-29 NOTE — OPERATIVE REPORT
OPERATIVE REPORT:     PATIENT NAME: Mitch Mooney  : 1/15/1955    CSN: [id number]     DATE OF OPERATION:   19    PREOPERATIVE DIAGNOSIS: Acute on Chronic Cholecystitis, Cholelithiasis      POSTOPERATIVE DIAGNOSIS: Acute on Chronic Cholecystitis, Ch duct was ligated with 2 vicryl endo-loops below the ductotomy and also clipped and transected. The cystic artery was clipped and transected and the gallbladder was removed from the liver bed using blunt dissection and electrocautery.   The gallbladder was

## 2019-03-29 NOTE — ANESTHESIA PROCEDURE NOTES
ANESTHESIA INTUBATION  Urgency: elective    Airway not difficult    General Information and Staff    Patient location during procedure: OR  Anesthesiologist: Rei Bangura MD  Resident/CRNA: Billie Abad CRNA  Performed: CRNA     Indications and Kalyan Huffman

## 2019-03-29 NOTE — PROGRESS NOTES
BRIDGET Bach updated on patient status in recovery room, Dr. Islas Net to bedside to re-eval, pt pain easily controlled and vital signs stable, pt to transfer to [facility] on remote tele.

## 2019-03-29 NOTE — BRIEF OP NOTE
[facility] POST ANESTHESIA CARE UNIT  Brief Op Note       Patients Name: Too Jacobsen  Attending Physician: Jorge L Lorenzo MD  Operating Physician: Willy Cardoza MD  CSN: [id number]     Location:  OR  MRN: [id number]    YOB: 1955

## 2019-03-30 VITALS
OXYGEN SATURATION: 96 % | RESPIRATION RATE: 16 BRPM | DIASTOLIC BLOOD PRESSURE: 59 MMHG | WEIGHT: 158.13 LBS | HEIGHT: 64 IN | TEMPERATURE: 98 F | SYSTOLIC BLOOD PRESSURE: 96 MMHG | BODY MASS INDEX: 27 KG/M2 | HEART RATE: 68 BPM

## 2019-03-30 PROCEDURE — 99232 SBSQ HOSP IP/OBS MODERATE 35: CPT | Performed by: INTERNAL MEDICINE

## 2019-03-30 RX ORDER — POTASSIUM CHLORIDE 20 MEQ/1
40 TABLET, EXTENDED RELEASE ORAL ONCE
Status: COMPLETED | OUTPATIENT
Start: 2019-03-30 | End: 2019-03-30

## 2019-03-30 NOTE — PROGRESS NOTES
[facility] ADULT PROGRESS NOTE    SUBJECTIVE  Patient seen and examined, chart reviewed. No acute events overnight. Recovering well after surgery.       Current Outpatient Medications:   •  HYDROcodone-acetaminophen 5-325 MG Oral Tab, Take 1-2 tablets by m DO  [facility] / [address] [facility]  [address] [facility],[address], [address] [facility]  Office [phone]

## 2019-03-30 NOTE — PROGRESS NOTES
[facility]    General Surgery Progress Note  Eliceo Hernandez  : 1/15/1955  CSN: [id number]  HD# 2    Subjective:   POD#1 laparoscopic cholecystectomy   No unusual complaints mild incisional pain as expected, denies abdominal pain, nausea and 11.9 03/30/2019    HCT 36.9 03/30/2019    .0 03/30/2019     03/30/2019    K 3.7 03/30/2019     03/30/2019    CO2 26.0 03/30/2019    BUN 12 03/30/2019    CREATSERUM 0.67 03/30/2019     03/30/2019    MG 2.1 03/30/2019    PHOS 2.1 03

## 2019-03-30 NOTE — PROGRESS NOTES
[facility]    Progress Note    [facility] Patient Status:  Inpatient    1/15/1955 MRN [id number]   Location [facility] 4W/SW/SE Attending Capri Gaspar MD   Hosp Day # 1 PCP Argentina Gamez MD       SUBJECTIVE:      [facility] duct.  Choledocholithiasis not identified. MRCP may be helpful for further evaluation of the biliary system.     Dictated by (CST): Eduard Camejo MD on 3/28/2019 at 9:18     Approved by (CST): Nandini Quintana MD on 3/28/2019 at 9:23 (CST): Oleksandr Plaza MD on 3/29/2019 at 9:33     Approved by (CST): Oleksandr Plaza MD on 3/29/2019 at 9:44                  Assessment and Plan:       Biliary colic with cholelithiasis  Liver enzymes normal; dilated CBD.  s/p lap cholecystectomy today.  Jorge Hines

## 2019-03-30 NOTE — PROGRESS NOTES
[facility] H[facility]P - [facility]    Progress Note    Qing Wen Patient Status:  Inpatient    1/15/1955 MRN [id number]   Location [facility] 4W/SW/SE Attending Ami Weiner MD   Hosp Day # 2 PCP Caroline Fiore MD     Subjective:     Constitu 3/28/2019  CONCLUSION:  1. Cholelithiasis with impacted gallstone noted in the neck of the gallbladder. 2. Mildly distended gallbladder and minimal thickening of the gallbladder wall. Mild Arce's sign. 3. Mildly dilated extrahepatic bile duct.   Choledoc

## 2019-03-31 NOTE — DISCHARGE SUMMARY
[facility]    Discharge Summary    Qing Wen Patient Status:  Inpatient    1/15/1955 MRN [id number]   Location [facility] 4W/SW/SE Attending No att. providers found   [address] Day # 2 PCP Caroline Fiore MD     Date of Admission Tabs  Commonly known as:  FLAGYL      Take 1 tablet (500 mg total) by mouth 2 (two) times daily for 5 days.    Stop taking on:  4/3/2019  Quantity:  10 tablet  Refills:  0        CONTINUE taking these medications      Instructions Prescription details   ace

## 2019-04-03 NOTE — PAYOR COMM NOTE
--------------  ADMISSION REVIEW     Payor: [address]  Subscriber #:  [id number]  Authorization Number: PENDING    Admit date: 3/28/19  Admit time: 26  DISCHARGED 3/30/19       Admitting Physician: Bella Stearns MD  Attending Physician: • CYSTOSCOPY TRANSURETHRAL RESECTION BLADDER TUMOR N/A 1/22/2018    Performed by Fariba Gallardo MD at [facility] OR   • [address] TUMOR Bilateral 12/18/2017    Performed by Fariba Gallardo MD at [address] [place] [address] Nursing note and vitals reviewed. Differential diagnosis includes viral syndrome, gastroenteritis, UTI. ED Course     Labs Reviewed   URINALYSIS WITH CULTURE REFLEX   CBC WITH DIFFERENTIAL WITH PLATELET    Narrative:      The following orders were c Patient endorsed to me from Dr. Ham Cunha. Patient with labs and imaging pending. Labs and urinalysis were unremarkable. CT scan concerning for cholelithiasis.   Given location of her pain and tenderness ultrasound was obtained and is consistent with acut Patient is a 82-year-old female with past medical history of ladder carcinoma status post TURBT who is admitted with acute biliary colic. The patient states that she initially felt well on the day prior to admission.   She had gone shopping and walked 4 mi She denies any chest pain or pressure or shortness of breath. She exercises regularly.           History     Past Medical History:   Diagnosis Date   • Anesthesia complication    • Bladder tumor    • Cancer [facility])     bladder 12/17   • Cellulitis 2002    ri Social History    Tobacco Use      Smoking status: Never Smoker      Smokeless tobacco: Never Used    Alcohol use: No    Drug use: No    Allergies/Medications:    Allergies:   Penicillins             NAUSEA ONLY, DIZZINESS    Medications Prior to Admission: Us Gallbladder (cpt=76705)    Result Date: 3/28/2019  CONCLUSION:  1. Cholelithiasis with impacted gallstone noted in the neck of the gallbladder. 2. Mildly distended gallbladder and minimal thickening of the gallbladder wall. Mild Arce's sign.  3. Mildl Pt's bladder cancer was near the R ureteral orifice. She had TURBT on 12/18/17. She underwent restaging TUR on 1/22/2018 which revealed no residual tumor but was notable for scarred right ureteral orifice, requiring right antegrade stent placement by IR. The patient has clinical picture consistent with Acute cholecystitis due to Cholelithiasis.   The patient and her mother understood the indications, details, potential risks and complications of the procedure including bleeding, infection, bile leak, need f The abdomen was copiously irrigated and careful hemostasis was ensured throughout. The pelvis was irrigated and suctioned. The ports were removed under direct visualization and the abdomen was desufflated.  The umbilical fascial incision was closed using in MCHC  34.2  33.3   RDW  12.5  13.0   NEPRELIM  8.31*  13.08*   WBC  10.3  15.5*   PLT  242.0  211.0                 Recent Labs   Lab  03/28/19   0543  03/29/19   0526   GLU  121*  135*   BUN  23*  8   CREATSERUM  0.83  0.79   GFRAA  87  93   GFRNAA  76  8 CONCLUSION:  1. Diffuse right-sided hydroureteronephrosis to the level of the right ureterovesical junction.  Although this could be residual from previous bladder tumor involvement, no intervening CT studies since 2017 have been performed, and this finding Pt's bladder cancer was near the R ureteral orifice.  She had TURBT on 12/18/17.  She underwent restaging TUR on 1/22/2018 which revealed no residual tumor but was notable for scarred right ureteral orifice, requiring right antegrade stent placement by IR. Blood pressure 102/56, pulse 69, temperature 98.2 °F (36.8 °C), temperature source Oral, resp. rate 18, height 5' 4\" (1.626 m), weight 158 lb 1.6 oz (71.7 kg), last menstrual period 12/10/2009, SpO2 95 %, not currently breastfeeding.      Constitutional: N Nm Renal With Lasix  (cpt=78708)     Result Date: 3/29/2019  CONCLUSION:  The left kidney is atrophic/small. It contributes to approximately 7% of overall renal function.   The contralateral right kidney has grossly normal perfusion, function, excretion co    Date of Discharge: 3/30/2019  1:42 PM     Admitting Diagnosis: Acute cholecystitis [K81.0]     Discharge Diagnosis: Patient Active Problem List:     Acute cholecystitis     Hydronephrosis of right kidney     H/O carcinoma of bladder     See acute care h Instructions Prescription details   acetaminophen 325 MG Tabs  Commonly known as:  TYLENOL       Take 2 tablets (650 mg total) by mouth every 4 (four) hours as needed.    Quantity:  1 tablet  Refills:  0      Multi Vitamin/Minerals Tabs       Take 1 tabl Prescription pain medication can make you nauseated, bloated and constipated. Only use it for severe pain. Take it with food and discontinue if side effects occur. No alcohol or driving while taking prescription pain medications. Norco contains Tylenol (ace

## 2019-04-04 ENCOUNTER — OFFICE VISIT (OUTPATIENT)
Dept: INTERNAL MEDICINE CLINIC | Facility: CLINIC | Age: 64
End: 2019-04-04
Payer: COMMERCIAL

## 2019-04-04 VITALS
SYSTOLIC BLOOD PRESSURE: 120 MMHG | RESPIRATION RATE: 16 BRPM | HEIGHT: 64 IN | WEIGHT: 150.38 LBS | DIASTOLIC BLOOD PRESSURE: 86 MMHG | TEMPERATURE: 98 F | OXYGEN SATURATION: 98 % | HEART RATE: 86 BPM | BODY MASS INDEX: 25.67 KG/M2

## 2019-04-04 DIAGNOSIS — Z90.49 S/P LAPAROSCOPIC CHOLECYSTECTOMY: Primary | ICD-10-CM

## 2019-04-04 PROCEDURE — 99213 OFFICE O/P EST LOW 20 MIN: CPT | Performed by: INTERNAL MEDICINE

## 2019-04-04 PROCEDURE — 99212 OFFICE O/P EST SF 10 MIN: CPT | Performed by: INTERNAL MEDICINE

## 2019-04-04 NOTE — PROGRESS NOTES
Otis Olivera is a 60year old female. Patient presents with:  Post-Op: Pt presents for post-op follow up. \"I had my gallbladder removed on Friday\" . . . \"I'm a little sore but feeling a lot better\".      HPI:     Here for f/u of cholecystitis s/p lap ccy distress  LUNGS: clear to auscultation  CARDIO: RRR, normal S1S2, without murmur or gallop  GI: soft, NT, ND, NABS, no HSM, well-healed incisions      ASSESSMENT AND PLAN:     S/p lap cholecystectomy  Doing great post-op. Getting back to exercise.   BM's r

## 2019-04-08 NOTE — PAYOR COMM NOTE
--------------  DISCHARGE REVIEW    Payor: [employer] [facility]  Subscriber #:  [id number]  Authorization Number: PENDING    Admit date: 3/28/19  Admit time:  26  Discharge Date: 3/30/2019  1:42 PM     Admitting Physician: Ariadne Moura MD  Attend Condition: Good         Discharge Medications:      Discharge Medications      START taking these medications      Instructions Prescription details   Ciprofloxacin HCl 500 MG Tabs  Commonly known as:  CIPRO      Take 1 tablet (500 mg total) by mouth 2 (tw COMMENTS

## 2019-04-11 PROBLEM — K81.0 ACUTE CHOLECYSTITIS: Status: RESOLVED | Noted: 2019-03-28 | Resolved: 2019-04-11

## 2019-07-10 ENCOUNTER — LAB ENCOUNTER (OUTPATIENT)
Dept: LAB | Facility: HOSPITAL | Age: 64
End: 2019-07-10
Attending: UROLOGY
Payer: COMMERCIAL

## 2019-07-10 DIAGNOSIS — R31.0 GROSS HEMATURIA: ICD-10-CM

## 2019-07-10 DIAGNOSIS — C67.9 BLADDER CANCER (HCC): Primary | ICD-10-CM

## 2019-07-10 PROCEDURE — 88108 CYTOPATH CONCENTRATE TECH: CPT

## 2019-07-10 PROCEDURE — 87086 URINE CULTURE/COLONY COUNT: CPT

## 2020-01-10 ENCOUNTER — LAB ENCOUNTER (OUTPATIENT)
Dept: LAB | Facility: HOSPITAL | Age: 65
End: 2020-01-10
Attending: UROLOGY
Payer: COMMERCIAL

## 2020-01-10 DIAGNOSIS — R31.0 GROSS HEMATURIA: Primary | ICD-10-CM

## 2020-01-10 PROCEDURE — 87086 URINE CULTURE/COLONY COUNT: CPT

## 2020-01-10 PROCEDURE — 87077 CULTURE AEROBIC IDENTIFY: CPT

## 2020-01-10 PROCEDURE — 88108 CYTOPATH CONCENTRATE TECH: CPT

## 2020-01-10 PROCEDURE — 87147 CULTURE TYPE IMMUNOLOGIC: CPT

## 2020-01-13 LAB — NON GYNE INTERPRETATION: NEGATIVE

## 2020-03-11 NOTE — LETTER
[facility], [place]  Authorization for Invasive Procedures  1.  I hereby authorize Dr. Arben Sanchez , my physician and whomever may be designated as the doctor's assistant, to perform the following operation and/or procedure:  Right Nephr performed for the purposes of advancing medicine, science, and/or education, provided my identity is not revealed. If the procedure has been videotaped, the physician/surgeon will obtain the original videotape.  The hospital will not be responsible for stor My signature below affirms that prior to the time of the procedure, I have explained to the patient and/or her legal representative, the risks and benefits involved in the proposed treatment and any reasonable alternative to the proposed treatment.  I have Myalgia Treatment: I explained this is common when taking isotretinoin. If this worsens they will contact us. They may try OTC ibuprofen. Xerosis Normal Treatment: I recommended application of Cetaphil or CeraVe numerous times a day and before going to bed to all dry areas. Xerosis Aggressive Treatment: I recommended application of Cetaphil or CeraVe numerous times a day and before going to bed to all dry areas. I also prescribed a topical steroid for twice daily use. Cheilitis Normal Treatment: I recommended application of Vaseline or Aquaphor numerous times a day (as often as every hour) and before going to bed. Display Individual Monthly Dosage In The Note (If Yes Will Display All Dosages Which Are Not N/A): yes Cheilitis Aggressive Treatment: I recommended application of Vaseline or Aquaphor numerous times a day (as often as every hour) and before going to bed. I also prescribed a topical steroid for twice daily use. Calculate Months Of Therapy Based On Documented Dosages (Will Hide Months Of Therapy Question)?: No Retinoid Dermatitis Normal Treatment: I recommended more frequent application of Cetaphil or CeraVe to the areas of dermatitis. Hypertriglyceridemia Monitoring: I explained this is common when taking isotretinoin. We will monitor closely. Retinoid Dermatitis Aggressive Treatment: I recommended more frequent application of Cetaphil or CeraVe to the areas of dermatitis. I also prescribed a topical steroid for twice daily use until the dermatitis resolves. Dosing Month 1 (Required For Cumulative Dosing): 40mg Daily Myalgia Monitoring: I explained this is common when taking isotretinoin. If this worsens they will contact us. Use Therapeutic Ranged Or Therapeutic Target: please select Range or Target Headache Monitoring: I recommended monitoring the headaches for now. There is no evidence of increased intracranial pressure. They were instructed to call if the headaches are worsening. Detail Level: Zone Counseling Text: I reviewed the side effect in detail. Patient should get monthly blood tests, not donate blood, not drive at night if vision affected, and not share medication. Lower Range (In Mg/Kg): [address] Upper Range (In Mg/Kg): [address] Female Pregnancy Counseling Text: Female patients should also be on two forms of birth control while taking this medication and for one month after their last dose. What Is The Patient's Gender: Female Target Cumulative Dosage (In Mg/Kg): [address] Pounds Preamble Statement (Weight Entered In Details Tab): Reported Weight in pounds: Next Month's Dosage: Continue Current Dosage Nosebleeds Normal Treatment: I explained this is common when taking isotretinoin. I recommended saline mist in each nostril multiple times a day. If this worsens they will contact us. Kilograms Preamble Statement (Weight Entered In Details Tab): Reported Weight in kilograms: Female Completion Statement: After discussing her treatment course we decided to discontinue isotretinoin therapy at this time. I explained that she would need to continue her birth control methods for at least one month after the last dosage. She should also get a pregnancy test one month after the last dose. She shouldn't donate blood for one month after the last dose. She should call with any new symptoms of depression. Xerosis Normal Treatment: I recommended application of Cetaphil or CeraVe numerous times a day going to bed to all dry areas. Male Completion Statement: After discussing his treatment course we decided to discontinue isotretinoin therapy at this time. He shouldn't donate blood for one month after the last dose. He should call with any new symptoms of depression. Xerosis Aggressive Treatment: I recommended application of Cetaphil or CeraVe numerous times a day going to bed to all dry areas. I also prescribed a topical steroid for twice daily use. Weight Units: pounds Patient Weight (Optional But Required For Cumulative Dose-Numbers And Decimals Only): [address] Months Of Therapy Completed: 4

## 2020-06-30 ENCOUNTER — LAB ENCOUNTER (OUTPATIENT)
Dept: LAB | Facility: HOSPITAL | Age: 65
End: 2020-06-30
Attending: UROLOGY
Payer: COMMERCIAL

## 2020-06-30 DIAGNOSIS — C67.9 BLADDER CANCER (HCC): Primary | ICD-10-CM

## 2020-06-30 PROCEDURE — 87086 URINE CULTURE/COLONY COUNT: CPT

## 2020-06-30 PROCEDURE — 88108 CYTOPATH CONCENTRATE TECH: CPT

## 2020-07-01 ENCOUNTER — TELEPHONE (OUTPATIENT)
Dept: INTERNAL MEDICINE CLINIC | Facility: CLINIC | Age: 65
End: 2020-07-01

## 2020-07-01 NOTE — TELEPHONE ENCOUNTER
Pt will call back, would like to schedule for later in the year  Pt will be following with Dr Judith Garcia

## 2020-07-02 NOTE — TELEPHONE ENCOUNTER
Apparently pt is under the impression that I am her primary. I am NOT her primary -- it is Dr. Shmuel Swanson. My practice is full.   Like ten pounds of potatoes in a five pound sack

## 2020-12-14 NOTE — PLAN OF CARE
----- Message from Zeke Douglsas sent at 12/14/2020  9:13 AM CST -----  Can the clinic reply in [facility]:     Please refill the medication(s) listed below. The patient can be reached at this phone number once it is called into the pharmacy. [phone] (home)      Medication #1drospirenone-ethinyl estradioL (SELIN, Wali,) 3-0.02 mg per tablet    Medication #2    Preferred Pharmacy: [id number] - [place] - [address] [facility]     DISCHARGE PLANNING    • Discharge to home or other facility with appropriate resources Progressing        PAIN - ADULT    • Verbalizes/displays adequate comfort level or patient's stated pain goal Progressing        Patient/Family Goals    • Patient/Family

## 2020-12-29 ENCOUNTER — OFFICE VISIT (OUTPATIENT)
Dept: INTERNAL MEDICINE CLINIC | Facility: CLINIC | Age: 65
End: 2020-12-29
Payer: COMMERCIAL

## 2020-12-29 ENCOUNTER — LAB ENCOUNTER (OUTPATIENT)
Dept: LAB | Age: 65
End: 2020-12-29
Attending: INTERNAL MEDICINE
Payer: COMMERCIAL

## 2020-12-29 VITALS
BODY MASS INDEX: 26.12 KG/M2 | SYSTOLIC BLOOD PRESSURE: 130 MMHG | DIASTOLIC BLOOD PRESSURE: 86 MMHG | HEART RATE: 76 BPM | TEMPERATURE: 98 F | WEIGHT: 153 LBS | OXYGEN SATURATION: 99 % | HEIGHT: 64 IN

## 2020-12-29 DIAGNOSIS — R10.12 LUQ ABDOMINAL PAIN: Primary | ICD-10-CM

## 2020-12-29 DIAGNOSIS — R10.12 LUQ ABDOMINAL PAIN: ICD-10-CM

## 2020-12-29 LAB
ALBUMIN SERPL-MCNC: 3.9 G/DL (ref 3.4–5)
ALBUMIN/GLOB SERPL: 1 {RATIO} (ref 1–2)
ALP LIVER SERPL-CCNC: 95 U/L
ALT SERPL-CCNC: 45 U/L
AMYLASE SERPL-CCNC: 41 U/L (ref 25–115)
ANION GAP SERPL CALC-SCNC: 1 MMOL/L (ref 0–18)
AST SERPL-CCNC: 23 U/L (ref 15–37)
BASOPHILS # BLD AUTO: 0.04 X10(3) UL (ref 0–0.2)
BASOPHILS NFR BLD AUTO: 0.6 %
BILIRUB SERPL-MCNC: 0.2 MG/DL (ref 0.1–2)
BUN BLD-MCNC: 16 MG/DL (ref 7–18)
BUN/CREAT SERPL: 19.3 (ref 10–20)
CALCIUM BLD-MCNC: 9.8 MG/DL (ref 8.5–10.1)
CHLORIDE SERPL-SCNC: 107 MMOL/L (ref 98–112)
CO2 SERPL-SCNC: 32 MMOL/L (ref 21–32)
CREAT BLD-MCNC: 0.83 MG/DL
DEPRECATED RDW RBC AUTO: 44.6 FL (ref 35.1–46.3)
EOSINOPHIL # BLD AUTO: 0.06 X10(3) UL (ref 0–0.7)
EOSINOPHIL NFR BLD AUTO: 0.9 %
ERYTHROCYTE [DISTWIDTH] IN BLOOD BY AUTOMATED COUNT: 12.9 % (ref 11–15)
GLOBULIN PLAS-MCNC: 3.8 G/DL (ref 2.8–4.4)
GLUCOSE BLD-MCNC: 101 MG/DL (ref 70–99)
HCT VFR BLD AUTO: 42.9 %
HGB BLD-MCNC: 14.3 G/DL
IMM GRANULOCYTES # BLD AUTO: 0.01 X10(3) UL (ref 0–1)
IMM GRANULOCYTES NFR BLD: 0.1 %
LIPASE SERPL-CCNC: 310 U/L (ref 73–393)
LYMPHOCYTES # BLD AUTO: 3.1 X10(3) UL (ref 1–4)
LYMPHOCYTES NFR BLD AUTO: 46.3 %
M PROTEIN MFR SERPL ELPH: 7.7 G/DL (ref 6.4–8.2)
MCH RBC QN AUTO: 31.4 PG (ref 26–34)
MCHC RBC AUTO-ENTMCNC: 33.3 G/DL (ref 31–37)
MCV RBC AUTO: 94.3 FL
MONOCYTES # BLD AUTO: 0.52 X10(3) UL (ref 0.1–1)
MONOCYTES NFR BLD AUTO: 7.8 %
NEUTROPHILS # BLD AUTO: 2.97 X10 (3) UL (ref 1.5–7.7)
NEUTROPHILS # BLD AUTO: 2.97 X10(3) UL (ref 1.5–7.7)
NEUTROPHILS NFR BLD AUTO: 44.3 %
OSMOLALITY SERPL CALC.SUM OF ELEC: 291 MOSM/KG (ref 275–295)
PATIENT FASTING Y/N/NP: NO
PLATELET # BLD AUTO: 254 10(3)UL (ref 150–450)
POTASSIUM SERPL-SCNC: 4.2 MMOL/L (ref 3.5–5.1)
RBC # BLD AUTO: 4.55 X10(6)UL
SODIUM SERPL-SCNC: 140 MMOL/L (ref 136–145)
WBC # BLD AUTO: 6.7 X10(3) UL (ref 4–11)

## 2020-12-29 PROCEDURE — 3008F BODY MASS INDEX DOCD: CPT | Performed by: INTERNAL MEDICINE

## 2020-12-29 PROCEDURE — 82150 ASSAY OF AMYLASE: CPT

## 2020-12-29 PROCEDURE — 3079F DIAST BP 80-89 MM HG: CPT | Performed by: INTERNAL MEDICINE

## 2020-12-29 PROCEDURE — 80053 COMPREHEN METABOLIC PANEL: CPT

## 2020-12-29 PROCEDURE — 85025 COMPLETE CBC W/AUTO DIFF WBC: CPT

## 2020-12-29 PROCEDURE — 3075F SYST BP GE 130 - 139MM HG: CPT | Performed by: INTERNAL MEDICINE

## 2020-12-29 PROCEDURE — 36415 COLL VENOUS BLD VENIPUNCTURE: CPT

## 2020-12-29 PROCEDURE — 99214 OFFICE O/P EST MOD 30 MIN: CPT | Performed by: INTERNAL MEDICINE

## 2020-12-29 PROCEDURE — 83690 ASSAY OF LIPASE: CPT

## 2020-12-29 NOTE — PROGRESS NOTES
Sung Polanco is a 63year old female. Patient presents with:  Problem: Pt c/o rib discomfort x 2 months no pain just discomfort     HPI:     Pt notes LUQ pain on/off for 4 months. Feels like pressure. Lots of gurgling in her abdomen.   BM's are normal and Temp 98 °F (36.7 °C)   Ht 5' 4\" (1.626 m)   Wt 153 lb (69.4 kg)   LMP 12/10/2009   SpO2 99%   BMI 26.26 kg/m²   GENERAL: well developed, well nourished, in no apparent distress  LUNGS: clear to auscultation  CARDIO: RRR, normal S1S2, without murmur or gal

## 2020-12-30 ENCOUNTER — HOSPITAL ENCOUNTER (OUTPATIENT)
Dept: CT IMAGING | Age: 65
Discharge: HOME | End: 2020-12-30
Attending: INTERNAL MEDICINE
Payer: COMMERCIAL

## 2020-12-30 DIAGNOSIS — R10.12 LUQ ABDOMINAL PAIN: ICD-10-CM

## 2020-12-30 PROCEDURE — 74177 CT ABD & PELVIS W/CONTRAST: CPT | Performed by: INTERNAL MEDICINE

## 2021-01-06 ENCOUNTER — TELEPHONE (OUTPATIENT)
Dept: INTERNAL MEDICINE CLINIC | Facility: CLINIC | Age: 66
End: 2021-01-06

## 2021-01-06 NOTE — TELEPHONE ENCOUNTER
Spoke to patient and relayed MD message and instructions, patient verbalizes understanding and agrees with plan. Patient is asking if Dr. Donnell Bustamante would be okay with her doing the cologuard instead of a colonoscopy?  And if so, if we can notify her and

## 2021-01-06 NOTE — TELEPHONE ENCOUNTER
CT scan unrevealing -- no explanation for her symptoms. Recommend a trial of OTC Benefiber once daily in case sx are related to irritable bowel.   Again recommend that she see GI Dr. Misti Campa to discuss (due for colonoscopy)

## 2021-01-07 NOTE — TELEPHONE ENCOUNTER
No, she is having symptoms. Cologuard only looks for polyps/cancer -- cannot see inflammation (colitis, Crohn's, etc).   Needs colonoscopy

## 2021-01-11 NOTE — TELEPHONE ENCOUNTER
Patient called and left detailed message relaying Dr Marcelina Mir message. Patient to call back office for any further questions.

## 2021-01-12 NOTE — TELEPHONE ENCOUNTER
Patient is returning nurse call (not available) she was unable to open the voicemail that was left  Please call [phone]

## 2021-01-12 NOTE — TELEPHONE ENCOUNTER
Patient called and relayed Dr Yudy Quiroga message. Patient verbalized understanding with no further questions noted.

## 2021-04-14 ENCOUNTER — LAB ENCOUNTER (OUTPATIENT)
Dept: LAB | Facility: HOSPITAL | Age: 66
End: 2021-04-14
Attending: UROLOGY
Payer: COMMERCIAL

## 2021-04-14 DIAGNOSIS — R31.0 GROSS HEMATURIA: Primary | ICD-10-CM

## 2021-04-14 DIAGNOSIS — C67.9 BLADDER CANCER (HCC): ICD-10-CM

## 2021-04-14 PROCEDURE — 88108 CYTOPATH CONCENTRATE TECH: CPT

## 2021-04-14 PROCEDURE — 87086 URINE CULTURE/COLONY COUNT: CPT

## 2021-04-28 ENCOUNTER — OFFICE VISIT (OUTPATIENT)
Dept: INTERNAL MEDICINE CLINIC | Facility: CLINIC | Age: 66
End: 2021-04-28
Payer: COMMERCIAL

## 2021-04-28 VITALS
DIASTOLIC BLOOD PRESSURE: 82 MMHG | WEIGHT: 145 LBS | SYSTOLIC BLOOD PRESSURE: 126 MMHG | TEMPERATURE: 99 F | HEIGHT: 64 IN | BODY MASS INDEX: 24.75 KG/M2 | HEART RATE: 72 BPM

## 2021-04-28 DIAGNOSIS — R10.12 LUQ ABDOMINAL PAIN: Primary | ICD-10-CM

## 2021-04-28 PROCEDURE — 3008F BODY MASS INDEX DOCD: CPT | Performed by: INTERNAL MEDICINE

## 2021-04-28 PROCEDURE — 3079F DIAST BP 80-89 MM HG: CPT | Performed by: INTERNAL MEDICINE

## 2021-04-28 PROCEDURE — 3074F SYST BP LT 130 MM HG: CPT | Performed by: INTERNAL MEDICINE

## 2021-04-28 PROCEDURE — 99214 OFFICE O/P EST MOD 30 MIN: CPT | Performed by: INTERNAL MEDICINE

## 2021-04-28 RX ORDER — MULTIVIT-MIN/IRON/FOLIC ACID/K 18-600-40
500 CAPSULE ORAL DAILY
COMMUNITY

## 2021-04-28 NOTE — PROGRESS NOTES
Jeannette Aragon is a 61year old female. Patient presents with:  Referral: Patient is here today to discuss getting a GI referral-- she has been dealing with bloating and increased gas for the past few months. Pressure occurrs on the left side- no pain.  BM's a nausea and vomiting)     severe      Social History:  Social History    Tobacco Use      Smoking status: Never Smoker      Smokeless tobacco: Never Used    Vaping Use      Vaping Use: Never used    Alcohol use: No    Drug use: No       REVIEW OF SYSTEMS:

## 2021-11-04 ENCOUNTER — LAB ENCOUNTER (OUTPATIENT)
Dept: LAB | Facility: HOSPITAL | Age: 66
End: 2021-11-04
Attending: UROLOGY
Payer: COMMERCIAL

## 2021-11-04 DIAGNOSIS — C67.9 BLADDER CANCER (HCC): Primary | ICD-10-CM

## 2021-11-04 PROCEDURE — 81001 URINALYSIS AUTO W/SCOPE: CPT

## 2021-11-04 PROCEDURE — 87077 CULTURE AEROBIC IDENTIFY: CPT

## 2021-11-04 PROCEDURE — 87186 SC STD MICRODIL/AGAR DIL: CPT

## 2021-11-04 PROCEDURE — 87086 URINE CULTURE/COLONY COUNT: CPT

## 2021-11-04 PROCEDURE — 88108 CYTOPATH CONCENTRATE TECH: CPT

## 2022-04-10 ENCOUNTER — HOSPITAL ENCOUNTER (OUTPATIENT)
Age: 67
Discharge: HOME | End: 2022-04-10
Payer: COMMERCIAL

## 2022-04-10 VITALS
HEART RATE: 72 BPM | SYSTOLIC BLOOD PRESSURE: 153 MMHG | RESPIRATION RATE: 18 BRPM | OXYGEN SATURATION: 100 % | DIASTOLIC BLOOD PRESSURE: 74 MMHG | TEMPERATURE: 97 F

## 2022-04-10 DIAGNOSIS — S43.401A SPRAIN OF RIGHT SHOULDER, UNSPECIFIED SHOULDER SPRAIN TYPE, INITIAL ENCOUNTER: Primary | ICD-10-CM

## 2022-04-10 DIAGNOSIS — M25.511 ACUTE PAIN OF RIGHT SHOULDER: ICD-10-CM

## 2022-04-10 PROCEDURE — 99213 OFFICE O/P EST LOW 20 MIN: CPT

## 2022-04-10 PROCEDURE — 99203 OFFICE O/P NEW LOW 30 MIN: CPT

## 2022-04-10 RX ORDER — CYCLOBENZAPRINE HCL 10 MG
10 TABLET ORAL 3 TIMES DAILY PRN
Qty: 20 TABLET | Refills: 0 | Status: SHIPPED | OUTPATIENT
Start: 2022-04-10 | End: 2022-04-17

## 2022-04-10 NOTE — ED INITIAL ASSESSMENT (HPI)
Patient reports right shoulder pain starting yesterday while lifting heavy boxes at work. States pain increased at bedtime and woke her from sleep. Denies numbness/tingling to right hand. Denies trauma. States pain improves with sitting up and with application of heat. Taking ibuprofen. Also states she had some right hip pain yesterday that resolved on its own.

## 2022-04-13 ENCOUNTER — LAB ENCOUNTER (OUTPATIENT)
Dept: LAB | Facility: HOSPITAL | Age: 67
End: 2022-04-13
Attending: UROLOGY
Payer: COMMERCIAL

## 2022-04-13 DIAGNOSIS — C67.9 BLADDER CANCER (HCC): Primary | ICD-10-CM

## 2022-04-13 LAB — NON GYNE INTERPRETATION: NEGATIVE

## 2022-04-13 PROCEDURE — 88108 CYTOPATH CONCENTRATE TECH: CPT

## 2022-04-13 PROCEDURE — 87086 URINE CULTURE/COLONY COUNT: CPT

## 2022-10-03 ENCOUNTER — HOSPITAL ENCOUNTER (EMERGENCY)
Facility: HOSPITAL | Age: 67
Discharge: HOME | End: 2022-10-03
Attending: EMERGENCY MEDICINE
Payer: MEDICARE

## 2022-10-03 ENCOUNTER — APPOINTMENT (OUTPATIENT)
Dept: CT IMAGING | Facility: HOSPITAL | Age: 67
End: 2022-10-03
Attending: EMERGENCY MEDICINE
Payer: MEDICARE

## 2022-10-03 VITALS
HEIGHT: 64 IN | BODY MASS INDEX: 26.98 KG/M2 | SYSTOLIC BLOOD PRESSURE: 145 MMHG | RESPIRATION RATE: 22 BRPM | OXYGEN SATURATION: 99 % | HEART RATE: 80 BPM | WEIGHT: 158 LBS | DIASTOLIC BLOOD PRESSURE: 83 MMHG | TEMPERATURE: 98 F

## 2022-10-03 DIAGNOSIS — R10.12 ABDOMINAL PAIN, LEFT UPPER QUADRANT: Primary | ICD-10-CM

## 2022-10-03 LAB
ALBUMIN SERPL-MCNC: 4 G/DL (ref 3.4–5)
ALP LIVER SERPL-CCNC: 91 U/L
ALT SERPL-CCNC: 56 U/L
ANION GAP SERPL CALC-SCNC: 7 MMOL/L (ref 0–18)
AST SERPL-CCNC: 23 U/L (ref 15–37)
BASOPHILS # BLD AUTO: 0.04 X10(3) UL (ref 0–0.2)
BASOPHILS NFR BLD AUTO: 0.6 %
BILIRUB DIRECT SERPL-MCNC: <0.1 MG/DL (ref 0–0.2)
BILIRUB SERPL-MCNC: 0.2 MG/DL (ref 0.1–2)
BILIRUB UR QL: NEGATIVE
BUN BLD-MCNC: 18 MG/DL (ref 7–18)
BUN/CREAT SERPL: 19.4 (ref 10–20)
CALCIUM BLD-MCNC: 9.6 MG/DL (ref 8.5–10.1)
CHLORIDE SERPL-SCNC: 108 MMOL/L (ref 98–112)
CLARITY UR: CLEAR
CO2 SERPL-SCNC: 26 MMOL/L (ref 21–32)
COLOR UR: YELLOW
CREAT BLD-MCNC: 0.93 MG/DL
DEPRECATED RDW RBC AUTO: 44.8 FL (ref 35.1–46.3)
EOSINOPHIL # BLD AUTO: 0.04 X10(3) UL (ref 0–0.7)
EOSINOPHIL NFR BLD AUTO: 0.6 %
ERYTHROCYTE [DISTWIDTH] IN BLOOD BY AUTOMATED COUNT: 12.7 % (ref 11–15)
GFR SERPLBLD BASED ON 1.73 SQ M-ARVRAT: 68 ML/MIN/1.73M2 (ref 60–?)
GLUCOSE BLD-MCNC: 136 MG/DL (ref 70–99)
GLUCOSE UR-MCNC: NEGATIVE MG/DL
HCT VFR BLD AUTO: 42.8 %
HGB BLD-MCNC: 14.1 G/DL
IMM GRANULOCYTES # BLD AUTO: 0.02 X10(3) UL (ref 0–1)
IMM GRANULOCYTES NFR BLD: 0.3 %
KETONES UR-MCNC: NEGATIVE MG/DL
LEUKOCYTE ESTERASE UR QL STRIP.AUTO: NEGATIVE
LIPASE SERPL-CCNC: 323 U/L (ref 73–393)
LYMPHOCYTES # BLD AUTO: 2.18 X10(3) UL (ref 1–4)
LYMPHOCYTES NFR BLD AUTO: 30.2 %
MCH RBC QN AUTO: 31.3 PG (ref 26–34)
MCHC RBC AUTO-ENTMCNC: 32.9 G/DL (ref 31–37)
MCV RBC AUTO: 94.9 FL
MONOCYTES # BLD AUTO: 0.53 X10(3) UL (ref 0.1–1)
MONOCYTES NFR BLD AUTO: 7.4 %
NEUTROPHILS # BLD AUTO: 4.4 X10 (3) UL (ref 1.5–7.7)
NEUTROPHILS # BLD AUTO: 4.4 X10(3) UL (ref 1.5–7.7)
NEUTROPHILS NFR BLD AUTO: 60.9 %
NITRITE UR QL STRIP.AUTO: NEGATIVE
OSMOLALITY SERPL CALC.SUM OF ELEC: 296 MOSM/KG (ref 275–295)
PH UR: 6 [PH] (ref 5–8)
PLATELET # BLD AUTO: 262 10(3)UL (ref 150–450)
POTASSIUM SERPL-SCNC: 3.7 MMOL/L (ref 3.5–5.1)
PROT SERPL-MCNC: 7.6 G/DL (ref 6.4–8.2)
PROT UR-MCNC: NEGATIVE MG/DL
RBC # BLD AUTO: 4.51 X10(6)UL
SODIUM SERPL-SCNC: 141 MMOL/L (ref 136–145)
SP GR UR STRIP: 1.01 (ref 1–1.03)
UROBILINOGEN UR STRIP-ACNC: 0.2
WBC # BLD AUTO: 7.2 X10(3) UL (ref 4–11)

## 2022-10-03 PROCEDURE — 81001 URINALYSIS AUTO W/SCOPE: CPT | Performed by: EMERGENCY MEDICINE

## 2022-10-03 PROCEDURE — 36415 COLL VENOUS BLD VENIPUNCTURE: CPT

## 2022-10-03 PROCEDURE — 74177 CT ABD & PELVIS W/CONTRAST: CPT | Performed by: EMERGENCY MEDICINE

## 2022-10-03 PROCEDURE — 99284 EMERGENCY DEPT VISIT MOD MDM: CPT

## 2022-10-03 PROCEDURE — 80076 HEPATIC FUNCTION PANEL: CPT | Performed by: EMERGENCY MEDICINE

## 2022-10-03 PROCEDURE — 81015 MICROSCOPIC EXAM OF URINE: CPT | Performed by: EMERGENCY MEDICINE

## 2022-10-03 PROCEDURE — 80048 BASIC METABOLIC PNL TOTAL CA: CPT | Performed by: EMERGENCY MEDICINE

## 2022-10-03 PROCEDURE — 83690 ASSAY OF LIPASE: CPT | Performed by: EMERGENCY MEDICINE

## 2022-10-03 PROCEDURE — 85025 COMPLETE CBC W/AUTO DIFF WBC: CPT | Performed by: EMERGENCY MEDICINE

## 2022-10-03 NOTE — ED INITIAL ASSESSMENT (HPI)
Pt c/o of abdominal pressure. Pt states its not pain but it feels more like a pressure on the LUQ that radiates to to the back. Denies urinary symptoms. No N/v. No chest pain. No fevers.  Pt states she follows up with GI due to \"different abdominal problems\"

## 2023-04-13 ENCOUNTER — TELEPHONE (OUTPATIENT)
Dept: DERMATOLOGY CLINIC | Facility: CLINIC | Age: 68
End: 2023-04-13

## 2023-04-13 ENCOUNTER — OFFICE VISIT (OUTPATIENT)
Dept: DERMATOLOGY CLINIC | Facility: CLINIC | Age: 68
End: 2023-04-13

## 2023-04-13 DIAGNOSIS — L30.9 DERMATITIS: Primary | ICD-10-CM

## 2023-04-13 PROCEDURE — 99203 OFFICE O/P NEW LOW 30 MIN: CPT | Performed by: PHYSICIAN ASSISTANT

## 2023-04-13 RX ORDER — CLOBETASOL PROPIONATE 0.5 MG/G
1 OINTMENT TOPICAL DAILY PRN
Qty: 30 G | Refills: 2 | Status: SHIPPED | OUTPATIENT
Start: 2023-04-13

## 2023-04-13 RX ORDER — TACROLIMUS 1 MG/G
1 OINTMENT TOPICAL 2 TIMES DAILY
Qty: 60 G | Refills: 3 | Status: SHIPPED | OUTPATIENT
Start: 2023-04-13

## 2023-04-14 NOTE — TELEPHONE ENCOUNTER
Medication PA Requested:    tacrolimus (PROTOPIC) 0.1 % External Ointment                                                      CoverMyMeds Used:  Key:  Quantity: 60 g  Day Supply:  Sig: Apply 1 Application topically 2 (two) times daily.   DX Code:  L30.9 Chronic eczema                                   CPT code (if applicable):   Case Number/Pending Ref#:

## 2023-04-17 NOTE — TELEPHONE ENCOUNTER
Medication PA Requested:    tacrolimus (PROTOPIC) 0.1 % External Ointment                                                      CoverMyMeds Used:  Key:  Quantity: 60 g  Day Supply:  Sig: Apply 1 Application topically 2 (two) times daily.   DX Code:  L30.9 Chronic eczema     epa submitted with LOV 04/13/2023  Awaiting determination

## 2023-04-19 ENCOUNTER — TELEPHONE (OUTPATIENT)
Dept: DERMATOLOGY CLINIC | Facility: CLINIC | Age: 68
End: 2023-04-19

## 2023-04-19 NOTE — TELEPHONE ENCOUNTER
LOV 4/13/2023. Patient started clobetasol x 6 days ago as prescribed. Patient now c/o increased itching and irritation to BLE. Redness noted to some areas. Patient does not have mychart to sent pictures. Patient informed to stop clobetasol until we call her back. Please advise. Thank you.

## 2023-04-19 NOTE — TELEPHONE ENCOUNTER
Patient called    Asking to speak to RN about reaction to Clobetasol Propionate. Wants a different medication called in.  Please call

## 2023-04-20 NOTE — TELEPHONE ENCOUNTER
If this has been happening for some time then maybe a biopsy would be necessary to see what his happening. Especially since clobetasol made things worse.

## 2023-04-25 ENCOUNTER — OFFICE VISIT (OUTPATIENT)
Dept: DERMATOLOGY CLINIC | Facility: CLINIC | Age: 68
End: 2023-04-25

## 2023-04-25 DIAGNOSIS — L30.9 DERMATITIS: Primary | ICD-10-CM

## 2023-04-25 PROCEDURE — 11104 PUNCH BX SKIN SINGLE LESION: CPT | Performed by: PHYSICIAN ASSISTANT

## 2023-04-25 PROCEDURE — 1126F AMNT PAIN NOTED NONE PRSNT: CPT | Performed by: PHYSICIAN ASSISTANT

## 2023-04-25 PROCEDURE — 88305 TISSUE EXAM BY PATHOLOGIST: CPT | Performed by: PHYSICIAN ASSISTANT

## 2023-04-25 PROCEDURE — 99213 OFFICE O/P EST LOW 20 MIN: CPT | Performed by: PHYSICIAN ASSISTANT

## 2023-04-25 NOTE — PROCEDURES
Procedure: With the patient is a supine position, the skin was scrubbed with alcohol. Anesthesia was obtained by injecting 2 mL of 1% Xylocaine with Epinephrine. A circular punch, 4 mm. In size was used to incise the lesion. The biopsy specimen was elevated from its base and transected. The specimen was sent for histopathological examination. Hemostasis was obtained with 4.0 ethilonx2. The biopsy site was dressed with bandaid and petroleum jelly. The estimated blood loss was < 1mL. Clinical Dx & Size of lesion(s):    Lesion 1 - Dermatitis- size: 4 mm     Rosa Maria tolerated the procedure well.   Complications:  none

## 2023-05-04 ENCOUNTER — NURSE ONLY (OUTPATIENT)
Dept: DERMATOLOGY CLINIC | Facility: CLINIC | Age: 68
End: 2023-05-04

## 2023-05-04 PROCEDURE — 1159F MED LIST DOCD IN RCRD: CPT | Performed by: PHYSICIAN ASSISTANT

## 2023-05-12 ENCOUNTER — LAB ENCOUNTER (OUTPATIENT)
Dept: LAB | Facility: HOSPITAL | Age: 68
End: 2023-05-12
Attending: UROLOGY
Payer: MEDICARE

## 2023-05-12 DIAGNOSIS — R31.0 GROSS HEMATURIA: Primary | ICD-10-CM

## 2023-05-12 PROCEDURE — 87077 CULTURE AEROBIC IDENTIFY: CPT

## 2023-05-12 PROCEDURE — 87086 URINE CULTURE/COLONY COUNT: CPT

## 2023-05-12 PROCEDURE — 87186 SC STD MICRODIL/AGAR DIL: CPT

## 2023-05-16 ENCOUNTER — TELEPHONE (OUTPATIENT)
Dept: DERMATOLOGY CLINIC | Facility: CLINIC | Age: 68
End: 2023-05-16

## 2023-05-16 NOTE — TELEPHONE ENCOUNTER
Rogelio - please see response from [facility]. Ok to advise pt or would you like to speak with pt directly? Please advise on rx as well. Thank you.

## 2023-05-16 NOTE — TELEPHONE ENCOUNTER
I would not use the tacrolimus alone. She would have to use it with a steroid at first to avoid irritation. I will get further direction from Dr. Tere Pollock before giving recommendations. Dr. Tere Pollock she has failed clobetasol and betamethasone. 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

## 2023-05-16 NOTE — TELEPHONE ENCOUNTER
LOV 4/25/23 pt calling with an update. She stopped betametahsone, stating it caused redness and irritation as well. She will call her pharmacy and se if she can pay out of pocket for the tacrolimus. States skin to ankles, calves is dry and flaky currently. Any further recommendations? She is willing to try another topical or just use tacrolimus.

## 2023-05-16 NOTE — TELEPHONE ENCOUNTER
Maybe try just triamcinolone ( 0.05% cream maybe rather than 0.1- maybe cream better for her?) over a moisturizer that does not burn. No fungus on bx.   NOt sure why she is having so much burning with the betamethasone- usually that is well tolerated even if pt's do not like ointments

## 2023-05-16 NOTE — TELEPHONE ENCOUNTER
What sort of moisturizer?  Vanicream or one of the eczema creams and or SA cream like gold bond psoriasis or Cerave Psoriasis or SA along with the TAC

## 2023-05-17 RX ORDER — TRIAMCINOLONE ACETONIDE 5 MG/G
1 CREAM TOPICAL 2 TIMES DAILY
Qty: 454 G | Refills: 0 | Status: SHIPPED | OUTPATIENT
Start: 2023-05-17

## 2023-06-16 ENCOUNTER — OFFICE VISIT (OUTPATIENT)
Dept: DERMATOLOGY CLINIC | Facility: CLINIC | Age: 68
End: 2023-06-16

## 2023-06-16 DIAGNOSIS — L20.84 INTRINSIC ATOPIC DERMATITIS: Primary | ICD-10-CM

## 2023-06-16 PROCEDURE — 99213 OFFICE O/P EST LOW 20 MIN: CPT | Performed by: PHYSICIAN ASSISTANT

## 2023-06-16 PROCEDURE — 1159F MED LIST DOCD IN RCRD: CPT | Performed by: PHYSICIAN ASSISTANT

## 2023-06-16 RX ORDER — BETAMETHASONE DIPROPIONATE 0.05 %
OINTMENT (GRAM) TOPICAL
Qty: 50 G | Refills: 2 | Status: SHIPPED | OUTPATIENT
Start: 2023-06-16

## 2023-08-03 NOTE — TELEPHONE ENCOUNTER
- ppi   Sent triamcinolone 0.5% for the patient. Advise to apply 2 times per day. Sent to pharmacy for the patient. Can use moisturizers listed by [facility] below as well.  Update in 1-2 weeks after using the cream.

## 2024-05-14 ENCOUNTER — LAB ENCOUNTER (OUTPATIENT)
Dept: LAB | Facility: HOSPITAL | Age: 69
End: 2024-05-14
Attending: UROLOGY

## 2024-05-14 DIAGNOSIS — C67.9 BLADDER CANCER (HCC): Primary | ICD-10-CM

## 2024-05-14 DIAGNOSIS — C68.9 UROTHELIAL CANCER (HCC): ICD-10-CM

## 2024-05-14 LAB
BILIRUB UR QL: NEGATIVE
CLARITY UR: CLEAR
GLUCOSE UR-MCNC: NORMAL MG/DL
HGB UR QL STRIP.AUTO: NEGATIVE
KETONES UR-MCNC: NEGATIVE MG/DL
LEUKOCYTE ESTERASE UR QL STRIP.AUTO: 25
NITRITE UR QL STRIP.AUTO: NEGATIVE
PH UR: 5.5 [PH] (ref 5–8)
PROT UR-MCNC: NEGATIVE MG/DL
SP GR UR STRIP: 1.02 (ref 1–1.03)
UROBILINOGEN UR STRIP-ACNC: NORMAL

## 2024-05-14 PROCEDURE — 81001 URINALYSIS AUTO W/SCOPE: CPT

## 2024-05-14 PROCEDURE — 87086 URINE CULTURE/COLONY COUNT: CPT

## 2024-05-14 PROCEDURE — 88108 CYTOPATH CONCENTRATE TECH: CPT

## 2024-05-15 LAB — NON GYNE INTERPRETATION: NEGATIVE

## 2024-11-04 NOTE — ANESTHESIA POSTPROCEDURE EVALUATION
Addended by: ALEJANDRO HOUSE on: 11/4/2024 11:04 AM     Modules accepted: Orders     Patient: Marry Olivier    Procedure Summary     Date Anesthesia Start Anesthesia Stop Room / Location    03/17/17 0728 0816 [facility] OR 03 / [facility] OR       Procedure Diagnosis Surgeon Responsible Provider    HYSTEROSCOPY DILATION AND CURETTAGE (N/A Vagin

## 2024-12-20 ENCOUNTER — OFFICE VISIT (OUTPATIENT)
Dept: DERMATOLOGY CLINIC | Facility: CLINIC | Age: 69
End: 2024-12-20

## 2024-12-20 DIAGNOSIS — L30.9 DERMATITIS: Primary | ICD-10-CM

## 2024-12-20 PROCEDURE — 99213 OFFICE O/P EST LOW 20 MIN: CPT | Performed by: PHYSICIAN ASSISTANT

## 2024-12-20 PROCEDURE — 1159F MED LIST DOCD IN RCRD: CPT | Performed by: PHYSICIAN ASSISTANT

## 2024-12-20 RX ORDER — KETOCONAZOLE 20 MG/G
CREAM TOPICAL
Qty: 30 G | Refills: 1 | Status: SHIPPED | OUTPATIENT
Start: 2024-12-20

## 2024-12-20 NOTE — PROGRESS NOTES
HPI:    Patient ID: Rosa Maria More is a 69 year old female.    Patient presents with atopic dermatitis, [facility] Notes the area is red and itchy for the past 2 weeks or less. Denies dryness. Admits to trying olay with hyaluronic acid body wash and an all natural fungal eliminator. No draining or tenderness noted. No allergies to medications noted. No new products noted.       Review of Systems   Constitutional:  Negative for chills and fever.   Musculoskeletal:  Negative for arthralgias and myalgias.   Skin:  Positive for rash. Negative for color change and wound.            Current Outpatient Medications   Medication Sig Dispense Refill    ketoconazole 2 % External Cream Apply to affected area 2 times daily. 30 g 1    betamethasone dipropionate 0.05 % External Ointment Apply steroid to legs bilaterally 2 times per day for 2 weeks then 1 time per day for 2 weeks then 3 times per week for 1 month. 50 g 2    Ascorbic Acid (VITAMIN C) 500 MG Oral Cap Take 500 mg by mouth daily.      Cholecalciferol (VITAMIN D3) 1000 units Oral Cap Take 1 tablet by mouth daily.      Multiple Vitamins-Minerals (MULTI VITAMIN/MINERALS) Oral Tab Take 1 tablet by mouth daily.        triamcinolone 0.5 % External Cream Apply 1 Application topically 2 (two) times daily. (Patient not taking: Reported on 12/20/2024) 454 g 0    clobetasol 0.05 % External Ointment Apply 1 Application topically daily as needed. (Patient not taking: Reported on 12/20/2024) 30 g 2    tacrolimus (PROTOPIC) 0.1 % External Ointment Apply 1 Application topically 2 (two) times daily. (Patient not taking: Reported on 12/20/2024) 60 g 3    PEG 3350-KCl-Na Bicarb-NaCl 420 g Oral Recon Soln Take as directed per MD (Patient not taking: Reported on 12/20/2024) 4000 mL 0    acetaminophen 325 MG Oral Tab Take 2 tablets (650 mg total) by mouth every 4 (four) hours as needed. (Patient not taking: Reported on 12/20/2024) 1 tablet 0     Allergies:Allergies[1]    LMP 12/10/2009   There is no height or weight on file to calculate BMI.  PHYSICAL EXAM:   Physical Exam  Constitutional:       General: She is not in acute distress.     Appearance: Normal appearance.   Skin:     General: Skin is warm and dry.      Findings: Rash present.      Comments: No draining or tenderness noted. No scaling noted. Slight erythema with macular lesions and patches. No blistering noted.    Neurological:      Mental Status: She is alert and oriented to person, place, and time.                ASSESSMENT/PLAN:   1. Dermatitis  -After discussion with patient, advised the following:  -Start ketoconazole   -Educated to apply 2 times per day for the next few weeks  -Patient will provide update in the next few weeks if there improvement.   -To call or follow-up with worsening symptoms or concerns  -Patient was agreeable to plan and will comply with discussion above.         No orders of the defined types were placed in this encounter.      Meds This Visit:  Requested Prescriptions     Signed Prescriptions Disp Refills    ketoconazole 2 % External Cream 30 g 1     Sig: Apply to affected area 2 times daily.       Imaging & Referrals:  None         ID#[id number]       [1]   Allergies  Allergen Reactions    Penicillins NAUSEA ONLY and DIZZINESS

## 2025-04-12 ENCOUNTER — HOSPITAL ENCOUNTER (OUTPATIENT)
Age: 70
Discharge: HOME | End: 2025-04-12
Attending: EMERGENCY MEDICINE
Payer: MEDICARE

## 2025-04-12 VITALS
SYSTOLIC BLOOD PRESSURE: 141 MMHG | HEART RATE: 66 BPM | TEMPERATURE: 98 F | RESPIRATION RATE: 18 BRPM | OXYGEN SATURATION: 97 % | DIASTOLIC BLOOD PRESSURE: 78 MMHG

## 2025-04-12 DIAGNOSIS — H10.33 ACUTE BACTERIAL CONJUNCTIVITIS OF BOTH EYES: Primary | ICD-10-CM

## 2025-04-12 DIAGNOSIS — J04.0 ACUTE LARYNGITIS: ICD-10-CM

## 2025-04-12 PROCEDURE — 99213 OFFICE O/P EST LOW 20 MIN: CPT

## 2025-04-12 RX ORDER — MOXIFLOXACIN 5 MG/ML
1 SOLUTION/ DROPS OPHTHALMIC 3 TIMES DAILY
Qty: 3 ML | Refills: 0 | Status: SHIPPED | OUTPATIENT
Start: 2025-04-12 | End: 2025-04-17

## 2025-04-12 NOTE — ED INITIAL ASSESSMENT (HPI)
Presents with 4 days of hoarse voice, congestion, and right eye irritation. Crusty drainage and itching to right eye. Does not wear contact lenses. No fever.

## 2025-04-12 NOTE — ED PROVIDER NOTES
Patient Seen in: [facility]    History     Chief Complaint   Patient presents with    Conjunctivitis     Stated Complaint: loosing voice-eye complaint    Subjective:   HPI      This is a 70-year-old female who presents to the immediate care with reported bilateral eye redness with drainage as well as a hoarse voice.  Patient's symptoms began approximately 2 days ago.  Patient had a hoarse voice now for approximately 4 to 5 days.  Patient reports that she had a stye in her right upper eyelid which has not resolved but noticed eye redness as well as some discharge and itchiness over the last 24 to 36 hours.  There is been no fever or chills no nausea or vomiting.  There are no known aggravating or alleviating factors.    Objective:     Past Medical History:    Anesthesia complication    Bladder tumor    Cancer (HCC)    bladder     Cellulitis    right heel from a blister that got infected    High cholesterol    PONV (postoperative nausea and vomiting)    severe              Past Surgical History:   Procedure Laterality Date          x3     Cholecystectomy  2019    Cyst aspiration right      Cystoscopy,insert ureteral stent      bladder tumor removed    D & c  2017    Dilation/curettage,diagnostic      Hysteroscopy  2017    Other surgical history      wisdom teeth                Social History     Socioeconomic History    Marital status: Single   Occupational History    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    HENT:  Positive for voice change.    Eyes:  Positive for discharge, redness and itching.   Respiratory: Negative.     Cardiovascular: Negative.    Gastrointestinal: Negative.    Skin: Negative.    Neurological: Negative.    All other systems reviewed and are negative.      Positive for stated complaint: loosing voice-eye complaint  Other systems are as noted in HPI.  Constitutional and vital signs reviewed.      All other systems reviewed and negative except as noted above.    Physical Exam     ED Triage Vitals [04/12/25 1402]   /78   Pulse 66   Resp 18   Temp 97.7 °F (36.5 °C)   Temp src Oral   SpO2 97 %   O2 Device None (Room air)       Current Vitals:   Vital Signs  BP: 141/78  Pulse: 66  Resp: 18  Temp: 97.7 °F (36.5 °C)  Temp src: Oral    Oxygen Therapy  SpO2: 97 %  O2 Device: None (Room air)      Right Eye Chart Acuity: 20/40, Uncorrected  Left Eye Chart Acuity: 20/40, Uncorrected  Physical Exam  Constitutional:       General: She is not in acute distress.     Appearance: Normal appearance. She is normal weight. She is not ill-appearing, toxic-appearing or diaphoretic.   HENT:      Head: Normocephalic and atraumatic.      Mouth/Throat:      Mouth: Mucous membranes are moist.   Eyes:      General:         Right eye: Discharge present.         Left eye: Discharge present.     Pupils: Pupils are equal, round, and reactive to light.      Comments: Bilateral eye injection--bulbar conjunctiva   Cardiovascular:      Rate and Rhythm: Normal rate and regular rhythm.      Pulses: Normal pulses.      Heart sounds: Normal heart sounds.   Pulmonary:      Effort: Pulmonary effort is normal.      Breath sounds: Normal breath sounds.   Musculoskeletal:         General: Normal range of motion.      Cervical back: Normal range of motion.   Skin:     General: Skin is warm.   Neurological:      General: No focal deficit present.      Mental Status: She is alert and oriented to person, place, and time. Mental  status is at baseline.   Psychiatric:         Mood and Affect: Mood normal.         Behavior: Behavior normal.         Thought Content: Thought content normal.         Judgment: Judgment normal.             ED Course   Labs Reviewed - No data to display                              MDM              Medical Decision Making  Medical decision making  Multiple diagnoses considered in the evaluation of this patient.  Vital signs reviewed and are stable. Differential diagnosis considered include, but not limited to: Bilateral conjunctivitis        Diagnosis: Bilateral conjunctivitis, viral laryngitis      Treatment Plan: Vigamox, voice rest        Disposition and Plan     Clinical Impression:  1. Acute bacterial conjunctivitis of both eyes    2. Acute laryngitis         Disposition:  Discharge  4/12/2025  2:24 pm    Follow-up:  Jessenia Aponte MD  [address] [place] [id number]  [phone]    In 1 week  As needed, If symptoms worsen          Medications Prescribed:  Discharge Medication List as of 4/12/2025  2:26 PM        START taking these medications    Details   moxifloxacin 0.5 % Ophthalmic Solution Place 1 drop into both eyes 3 (three) times daily for 5 days., Normal, Disp-3 mL, R-0             Supplementary Documentation:

## 2025-05-09 ENCOUNTER — LAB ENCOUNTER (OUTPATIENT)
Dept: LAB | Facility: HOSPITAL | Age: 70
End: 2025-05-09
Attending: UROLOGY
Payer: MEDICARE

## 2025-05-09 DIAGNOSIS — C67.9 BLADDER CANCER (HCC): Primary | ICD-10-CM

## 2025-05-09 DIAGNOSIS — R73.03 PREDIABETES: ICD-10-CM

## 2025-05-09 LAB
BILIRUB UR QL: NEGATIVE
CLARITY UR: CLEAR
COLOR UR: COLORLESS
GLUCOSE UR-MCNC: NORMAL MG/DL
HGB UR QL STRIP.AUTO: NEGATIVE
KETONES UR-MCNC: NEGATIVE MG/DL
LEUKOCYTE ESTERASE UR QL STRIP.AUTO: NEGATIVE
NITRITE UR QL STRIP.AUTO: NEGATIVE
PH UR: 5.5 [PH] (ref 5–8)
PROT UR-MCNC: NEGATIVE MG/DL
SP GR UR STRIP: 1.01 (ref 1–1.03)
UROBILINOGEN UR STRIP-ACNC: NORMAL

## 2025-05-09 PROCEDURE — 87086 URINE CULTURE/COLONY COUNT: CPT

## 2025-05-09 PROCEDURE — 87077 CULTURE AEROBIC IDENTIFY: CPT

## 2025-05-09 PROCEDURE — 87186 SC STD MICRODIL/AGAR DIL: CPT

## 2025-05-09 PROCEDURE — 81003 URINALYSIS AUTO W/O SCOPE: CPT

## 2025-08-01 ENCOUNTER — OFFICE VISIT (OUTPATIENT)
Dept: DERMATOLOGY CLINIC | Facility: CLINIC | Age: 70
End: 2025-08-01

## 2025-08-01 DIAGNOSIS — L30.9 DERMATITIS: Primary | ICD-10-CM

## 2025-08-01 PROCEDURE — 1159F MED LIST DOCD IN RCRD: CPT | Performed by: PHYSICIAN ASSISTANT

## 2025-08-01 PROCEDURE — 99213 OFFICE O/P EST LOW 20 MIN: CPT | Performed by: PHYSICIAN ASSISTANT

## 2025-08-01 RX ORDER — FLUOCINONIDE 0.5 MG/G
1 OINTMENT TOPICAL 2 TIMES DAILY
Qty: 60 G | Refills: 2 | Status: SHIPPED | OUTPATIENT
Start: 2025-08-01

## 2025-08-22 ENCOUNTER — OFFICE VISIT (OUTPATIENT)
Dept: DERMATOLOGY CLINIC | Facility: CLINIC | Age: 70
End: 2025-08-22

## 2025-08-22 DIAGNOSIS — B35.4 TINEA CORPORIS: Primary | ICD-10-CM

## 2025-08-22 PROCEDURE — 99213 OFFICE O/P EST LOW 20 MIN: CPT | Performed by: PHYSICIAN ASSISTANT

## 2025-08-22 RX ORDER — KETOCONAZOLE 20 MG/G
CREAM TOPICAL
Qty: 30 G | Refills: 1 | Status: SHIPPED | OUTPATIENT
Start: 2025-08-22

## 2025-08-28 ENCOUNTER — MED REC SCAN ONLY (OUTPATIENT)
Dept: INTERNAL MEDICINE CLINIC | Facility: CLINIC | Age: 70
End: 2025-08-28

## (undated) DEVICE — SOL  .9 3000ML

## (undated) DEVICE — LAP CHOLE: Brand: MEDLINE INDUSTRIES, INC.

## (undated) DEVICE — MEDI-VAC NON-CONDUCTIVE SUCTION TUBING: Brand: CARDINAL HEALTH

## (undated) DEVICE — TISSUE RETRIEVAL SYSTEM: Brand: INZII RETRIEVAL SYSTEM

## (undated) DEVICE — UROLOGY DRAIN BAG STERILE

## (undated) DEVICE — STERILE POLYISOPRENE POWDER-FREE SURGICAL GLOVES: Brand: PROTEXIS

## (undated) DEVICE — LIGAMAX 5 MM ENDOSCOPIC MULTIPLE CLIP APPLIER: Brand: LIGAMAX

## (undated) DEVICE — SUTURE VICRYL 0 UR-6

## (undated) DEVICE — ISOVUE 300 10X100ML VIAL

## (undated) DEVICE — TROCAR: Brand: KII FIOS FIRST ENTRY

## (undated) DEVICE — GOWN SURG AERO BLUE PERF LG

## (undated) DEVICE — Device

## (undated) DEVICE — CATH URET CONE TIP 8FR 138008

## (undated) DEVICE — CYSTO PACK: Brand: MEDLINE INDUSTRIES, INC.

## (undated) DEVICE — T.U.R. ADD ON PACK: Brand: MEDLINE INDUSTRIES, INC.

## (undated) DEVICE — TROCAR: Brand: KII® SLEEVE

## (undated) DEVICE — CUTTING LOOP, BIPOLAR, 0.30MM, 24/26 FR.: Brand: N.A.

## (undated) DEVICE — CULTURE TUBE ANAEROBIC

## (undated) DEVICE — NITINOL WIRE ANGLED 035

## (undated) DEVICE — STERILE LATEX POWDER-FREE SURGICAL GLOVESWITH NITRILE COATING: Brand: PROTEXIS

## (undated) DEVICE — OPEN-END URETERAL CATHETER SOF-FLEX: Brand: SOF-FLEX

## (undated) DEVICE — SOL  .9 1000ML BAG

## (undated) DEVICE — SOL H2O 3000ML IRRIG

## (undated) DEVICE — HYSTEROSCOPY: Brand: MEDLINE INDUSTRIES, INC.

## (undated) DEVICE — VICRYL VLT 0 45CM ENDOLOOP: Brand: ENDOLOOP

## (undated) DEVICE — UNDYED BRAIDED (POLYGLACTIN 910), SYNTHETIC ABSORBABLE SUTURE: Brand: COATED VICRYL

## (undated) DEVICE — UROLOGY DRAIN BAG

## (undated) DEVICE — TIGERTAIL 6F FLXTIP 70CM

## (undated) DEVICE — BAG DRN 2L ANRFLX CHMBR INFCT

## (undated) DEVICE — SOLO FLEX HYBRID GUIDEWIRE .03

## (undated) DEVICE — [HIGH FLOW INSUFFLATOR,  DO NOT USE IF PACKAGE IS DAMAGED,  KEEP DRY,  KEEP AWAY FROM SUNLIGHT,  PROTECT FROM HEAT AND RADIOACTIVE SOURCES.]: Brand: PNEUMOSURE

## (undated) DEVICE — CULTURE COLLECT/TRANSPORT SYS

## (undated) DEVICE — SOL  .9 1000ML BTL

## (undated) DEVICE — ENCORE® LATEX ACCLAIM SIZE 8, STERILE LATEX POWDER-FREE SURGICAL GLOVE: Brand: ENCORE

## (undated) DEVICE — NEEDLE HPO 18GA 1.5IN ECLPS

## (undated) DEVICE — LUBRICANT JLY SURGILUBE 2OZ

## (undated) DEVICE — BENTSON PTFE WIRE GUIDE WITH 15CM FLEXIBLE TIP: Brand: BENTSON

## (undated) DEVICE — SOL H2O 1000ML BTL

## (undated) DEVICE — PLASTC TOOMEY SYRNG DISP

## (undated) DEVICE — 12 ML SYRINGE LUER-LOCK TIP: Brand: MONOJECT

## (undated) DEVICE — SUCTION CANISTER, 3000CC,SAFELINER: Brand: DEROYAL

## (undated) DEVICE — WOLF INFLOW HYSTER

## (undated) DEVICE — ENCORE® LATEX MICRO SIZE 8, STERILE LATEX POWDER-FREE SURGICAL GLOVE: Brand: ENCORE

## (undated) DEVICE — BAG DRAIN INFECTION CNTRL 2000

## (undated) NOTE — ED AVS SNAPSHOT
Chris Snow   MRN: [id number]    Department:  [facility] Emergency Department   Date of Visit:  12/19/2017           Disclosure     Insurance plans vary and the physician(s) referred by the ER may not be covered by your plan.  Please contact yo CARE PHYSICIAN AT ONCE OR RETURN IMMEDIATELY TO THE EMERGENCY DEPARTMENT. If you have been prescribed any medication(s), please fill your prescription right away and begin taking the medication(s) as directed.   If you believe that any of the medications

## (undated) NOTE — IP AVS SNAPSHOT
[facility]    [address], [place], [place] ~ [phone]                Discharge Summary   3/17/2017    Tom Walker           Admission Information        Provider Department    3/17/2017 Ely Lopez MD [facility] Pre-Op R · If you had a nerve block for your surgery, take extra care not to put any pressure on your arm or hand for 24 hours    It is normal:  · For you to have a sore throat if you had a breathing tube during surgery (while you were asleep!).  The sore throat jassi stamped envelope. The questionnaire should take no longer than a few minutes to complete and your answers are private. Your health and feedback are important to us!     If you receive a NSQIP questionnaire, and have questions please contact:   Anne [facility] Ring can help with your Affordable Care Act coverage, as well as all types of Medicaid plans. To get signed up and covered, please call [phone] and ask to get set up for an insurance coverage that is in-network with [facility].         Visit

## (undated) NOTE — MR AVS SNAPSHOT
[address] [id number]  [phone]               Thank you for choosing us for your health care visit with Robson Martinez DO. We are glad to serve you and happy to provide you with this summary of your visit.   Please he [facility])  [address], [address] [facility] ([address])  [address] [place], 20 [facility]  130 [facility] numbers can create reimbursement difficulties for you.     Assoc Dx:  Encounter for screening colonoscopy for non-high-risk patient [Z12.11]          Reason for Today's Visit     Neck Pain           Medical Issues Discussed Today     Encounter for screening

## (undated) NOTE — LETTER
Date & Time: 4/10/2022, 9:02 AM  Patient: Kip Dandy  Encounter Provider(s):    NEHEMIAS Epps       To Whom It May Concern:    Herman Ricketts was seen and treated in our department on 4/10/2022. She can return to work 04/11/2022, with mild restrictions for the next 3 to 4 days. No overhead lifting. No lifting greater than 10 pounds from the ground to waist.  No climbing, or crawling. No pushing or pulling greater than 10 pounds. Any further work clearance will be needed by her primary care provider, or our occupational health given on the discharge instructions.     Nae Officer                 Physician/APC Signature